# Patient Record
Sex: FEMALE | Race: WHITE | NOT HISPANIC OR LATINO
[De-identification: names, ages, dates, MRNs, and addresses within clinical notes are randomized per-mention and may not be internally consistent; named-entity substitution may affect disease eponyms.]

---

## 2017-04-04 NOTE — ASU PATIENT PROFILE, ADULT - HEALTHCARE QUESTIONS, PROFILE
spoke with Dr. Dimas & anesthesia prior to surgery spoke with Dr. avila & anesthesia prior to surgery

## 2017-04-04 NOTE — ASU PATIENT PROFILE, ADULT - PSH
Cataract of right eye    History of colon resection History of bilateral cataract extraction    History of colon resection    S/P shoulder surgery

## 2017-04-05 ENCOUNTER — TRANSCRIPTION ENCOUNTER (OUTPATIENT)
Age: 67
End: 2017-04-05

## 2017-04-05 ENCOUNTER — OUTPATIENT (OUTPATIENT)
Dept: OUTPATIENT SERVICES | Facility: HOSPITAL | Age: 67
LOS: 1 days | End: 2017-04-05
Payer: MEDICARE

## 2017-04-05 VITALS
SYSTOLIC BLOOD PRESSURE: 137 MMHG | HEART RATE: 75 BPM | RESPIRATION RATE: 16 BRPM | OXYGEN SATURATION: 96 % | HEIGHT: 64 IN | WEIGHT: 151.24 LBS | DIASTOLIC BLOOD PRESSURE: 76 MMHG | TEMPERATURE: 98 F

## 2017-04-05 VITALS
HEART RATE: 79 BPM | SYSTOLIC BLOOD PRESSURE: 102 MMHG | DIASTOLIC BLOOD PRESSURE: 57 MMHG | OXYGEN SATURATION: 98 % | RESPIRATION RATE: 16 BRPM

## 2017-04-05 DIAGNOSIS — Z98.41 CATARACT EXTRACTION STATUS, RIGHT EYE: Chronic | ICD-10-CM

## 2017-04-05 DIAGNOSIS — H35.371 PUCKERING OF MACULA, RIGHT EYE: ICD-10-CM

## 2017-04-05 DIAGNOSIS — Z98.890 OTHER SPECIFIED POSTPROCEDURAL STATES: Chronic | ICD-10-CM

## 2017-04-05 PROCEDURE — 67041 VIT FOR MACULAR PUCKER: CPT | Mod: RT,XS

## 2017-04-05 PROCEDURE — C1889: CPT

## 2017-04-05 PROCEDURE — 67042 VIT FOR MACULAR HOLE: CPT | Mod: RT

## 2017-04-05 NOTE — ASU DISCHARGE PLAN (ADULT/PEDIATRIC). - NOTIFY
Pain not relieved by Medications/Fever greater than 101/Bleeding that does not stop/Swelling that continues/Persistent Nausea and Vomiting

## 2017-04-05 NOTE — ASU DISCHARGE PLAN (ADULT/PEDIATRIC). - PT EDUC
other (specify)/[employer] card, green bracelet, Eye shield with instructions , sunglasses and eye kit given to patient.

## 2017-12-18 ENCOUNTER — RESULT REVIEW (OUTPATIENT)
Age: 67
End: 2017-12-18

## 2018-11-12 ENCOUNTER — APPOINTMENT (OUTPATIENT)
Dept: CARDIOLOGY | Facility: CLINIC | Age: 68
End: 2018-11-12
Payer: MEDICARE

## 2018-11-12 ENCOUNTER — LABORATORY RESULT (OUTPATIENT)
Age: 68
End: 2018-11-12

## 2018-11-12 ENCOUNTER — NON-APPOINTMENT (OUTPATIENT)
Age: 68
End: 2018-11-12

## 2018-11-12 VITALS
DIASTOLIC BLOOD PRESSURE: 82 MMHG | OXYGEN SATURATION: 97 % | HEART RATE: 70 BPM | RESPIRATION RATE: 15 BRPM | BODY MASS INDEX: 26.12 KG/M2 | SYSTOLIC BLOOD PRESSURE: 118 MMHG | HEIGHT: 64 IN | WEIGHT: 153 LBS

## 2018-11-12 DIAGNOSIS — Z86.010 PERSONAL HISTORY OF COLONIC POLYPS: ICD-10-CM

## 2018-11-12 DIAGNOSIS — Z78.9 OTHER SPECIFIED HEALTH STATUS: ICD-10-CM

## 2018-11-12 PROCEDURE — 99204 OFFICE O/P NEW MOD 45 MIN: CPT

## 2018-11-12 PROCEDURE — 93000 ELECTROCARDIOGRAM COMPLETE: CPT

## 2018-11-12 RX ORDER — ELECTROLYTES/DEXTROSE
SOLUTION, ORAL ORAL DAILY
Refills: 0 | Status: ACTIVE | COMMUNITY
Start: 2018-11-12

## 2018-11-12 NOTE — ASSESSMENT
[FreeTextEntry1] : This is a 68 year-old-female history of colon cancer, hyperlipidemia, comes in for followup cardiac evaluation. She denies chest pain, chest pressure, PND, dizziness, or syncope. The patient had recent blood work done by Dr. Schoenhaus-Luchs, which revealed an LDL cholesterol 193 mg/dL and a HDL of 55mg/dL. Her last visit was 3 years ago in 2015, and her bloodwork then showed a LDL of 140 mg/dL. She is not on any cholesterol medication and was advised in 2015 to control her diet and have life style modification in hopes of lowering LDL cholesterol.

## 2018-11-12 NOTE — REASON FOR VISIT
[Follow-Up - Clinic] : a clinic follow-up of [FreeTextEntry1] : This is a 68 year-old-female history of colon cancer, hyperlipidemia, comes in for followup cardiac evaluation.

## 2018-11-12 NOTE — PHYSICAL EXAM
[General Appearance - Well Developed] : well developed [Normal Appearance] : normal appearance [Well Groomed] : well groomed [General Appearance - Well Nourished] : well nourished [No Deformities] : no deformities [General Appearance - In No Acute Distress] : no acute distress [Normal Conjunctiva] : the conjunctiva exhibited no abnormalities [Normal Oral Mucosa] : normal oral mucosa [Normal Jugular Venous A Waves Present] : normal jugular venous A waves present [Normal Jugular Venous V Waves Present] : normal jugular venous V waves present [No Jugular Venous Browne A Waves] : no jugular venous browne A waves [Heart Rate And Rhythm] : heart rate and rhythm were normal [Heart Sounds] : normal S1 and S2 [Murmurs] : no murmurs present [Respiration, Rhythm And Depth] : normal respiratory rhythm and effort [Exaggerated Use Of Accessory Muscles For Inspiration] : no accessory muscle use [Auscultation Breath Sounds / Voice Sounds] : lungs were clear to auscultation bilaterally [Abdomen Soft] : soft [Abnormal Walk] : normal gait [Gait - Sufficient For Exercise Testing] : the gait was sufficient for exercise testing [Nail Clubbing] : no clubbing of the fingernails [Cyanosis, Localized] : no localized cyanosis [Skin Color & Pigmentation] : normal skin color and pigmentation [] : no rash [Oriented To Time, Place, And Person] : oriented to person, place, and time [Affect] : the affect was normal [Mood] : the mood was normal [No Anxiety] : not feeling anxious [5th Left ICS - MCL] : palpated at the 5th LICS in the midclavicular line [Normal] : normal [No Precordial Heave] : no precordial heave was noted [Normal Rate] : normal [Rhythm Regular] : regular [Normal S1] : normal S1 [Normal S2] : normal S2 [No Gallop] : no gallop heard [S3] : no S3 [S4] : no S4 [Click] : no click [Distant] : the heart sounds were ~L not distant [Pericardial Rub] : no pericardial rub [II] : a grade 2 [Right Carotid Bruit] : no bruit heard over the right carotid [Left Carotid Bruit] : no bruit heard over the left carotid [Right Femoral Bruit] : no bruit heard over the right femoral artery [Left Femoral Bruit] : no bruit heard over the left femoral artery [2+] : left 2+ [No Abnormalities] : the abdominal aorta was not enlarged and no bruit was heard [No Pitting Edema] : no pitting edema present [Bowel Sounds] : normal bowel sounds

## 2018-11-12 NOTE — DISCUSSION/SUMMARY
[FreeTextEntry1] : This is a 68-year-old female past medical history significant for hypercholesterolemia, history of colon cancer, murmurs, comes in for cardiac consultation. She denies chest pain, shortness of breath, dizziness or syncope.\par The patient's cardiac risk factors include history of hypercholesterolemia. She reports that both brothers also high cholesterol.\par She has no history of rheumatic fever. She does not drink excessive caffeine or alcohol. She does not feel that her diet is poor.\par Blood work done by her primary care physician July 13, 2018 demonstrated total cholesterol 273 mg/dL, LDL calculated cholesterol 193 mg/dL, HDL 55 mg/dL, triglyceride 126 mg/dL, hemoglobin A1c of 5.4.\par The finding of LDL cholesterol greater than 190 is consistent with familial heterozygous hypercholesterolemia. The patient reports that she has had LDL cholesterols in the past of 140 mg/dL. A measure of direct LDL cholesterol is more accurate as usually higher than the calculated number. Blood work was done today for direct LDL cholesterol.\par Electrocardiogram done November 12, 2018 demonstrated normal sinus rhythm rate 70 beats per minutes otherwise unremarkable.\par Patient is instructed to follow with you regarding her overall medical care. She will schedule echo Doppler examination to evaluate her murmur, chamber size, left ventricular function, rule out hypertrophy.\par Further recommendations will made after the results of the blood tests are available.

## 2018-12-05 ENCOUNTER — APPOINTMENT (OUTPATIENT)
Dept: CARDIOLOGY | Facility: CLINIC | Age: 68
End: 2018-12-05
Payer: MEDICARE

## 2018-12-05 VITALS
HEIGHT: 64 IN | HEART RATE: 92 BPM | DIASTOLIC BLOOD PRESSURE: 80 MMHG | BODY MASS INDEX: 24.75 KG/M2 | RESPIRATION RATE: 16 BRPM | WEIGHT: 145 LBS | SYSTOLIC BLOOD PRESSURE: 130 MMHG

## 2018-12-05 PROCEDURE — 93015 CV STRESS TEST SUPVJ I&R: CPT

## 2018-12-05 PROCEDURE — 99213 OFFICE O/P EST LOW 20 MIN: CPT | Mod: 25

## 2018-12-05 PROCEDURE — 93306 TTE W/DOPPLER COMPLETE: CPT

## 2018-12-05 NOTE — PHYSICAL EXAM
[General Appearance - Well Developed] : well developed [Normal Appearance] : normal appearance [Well Groomed] : well groomed [General Appearance - Well Nourished] : well nourished [No Deformities] : no deformities [General Appearance - In No Acute Distress] : no acute distress [Normal Conjunctiva] : the conjunctiva exhibited no abnormalities [Normal Oral Mucosa] : normal oral mucosa [Normal Jugular Venous A Waves Present] : normal jugular venous A waves present [Normal Jugular Venous V Waves Present] : normal jugular venous V waves present [No Jugular Venous Browne A Waves] : no jugular venous browne A waves [Respiration, Rhythm And Depth] : normal respiratory rhythm and effort [Exaggerated Use Of Accessory Muscles For Inspiration] : no accessory muscle use [Auscultation Breath Sounds / Voice Sounds] : lungs were clear to auscultation bilaterally [Bowel Sounds] : normal bowel sounds [Abdomen Soft] : soft [Abnormal Walk] : normal gait [Gait - Sufficient For Exercise Testing] : the gait was sufficient for exercise testing [Nail Clubbing] : no clubbing of the fingernails [Cyanosis, Localized] : no localized cyanosis [Skin Color & Pigmentation] : normal skin color and pigmentation [] : no rash [Oriented To Time, Place, And Person] : oriented to person, place, and time [Affect] : the affect was normal [Mood] : the mood was normal [No Anxiety] : not feeling anxious [5th Left ICS - MCL] : palpated at the 5th LICS in the midclavicular line [Normal] : normal [No Precordial Heave] : no precordial heave was noted [Normal Rate] : normal [Rhythm Regular] : regular [Normal S1] : normal S1 [Normal S2] : normal S2 [No Gallop] : no gallop heard [S3] : no S3 [S4] : no S4 [Click] : no click [Pericardial Rub] : no pericardial rub [II] : a grade 2 [Right Carotid Bruit] : no bruit heard over the right carotid [Left Carotid Bruit] : no bruit heard over the left carotid [Right Femoral Bruit] : no bruit heard over the right femoral artery [Left Femoral Bruit] : no bruit heard over the left femoral artery [2+] : left 2+ [No Abnormalities] : the abdominal aorta was not enlarged and no bruit was heard [No Pitting Edema] : no pitting edema present

## 2018-12-06 ENCOUNTER — RESULT CHARGE (OUTPATIENT)
Age: 68
End: 2018-12-06

## 2018-12-06 RX ORDER — ZINC OXIDE 13 %
CREAM (GRAM) TOPICAL
Refills: 0 | Status: DISCONTINUED | COMMUNITY
End: 2018-12-06

## 2018-12-06 RX ORDER — MULTIVITAMIN
TABLET ORAL
Refills: 0 | Status: DISCONTINUED | COMMUNITY
End: 2018-12-06

## 2018-12-06 RX ORDER — CALCIUM CARBONATE/VITAMIN D3 600 MG-10
TABLET ORAL
Refills: 0 | Status: DISCONTINUED | COMMUNITY
End: 2018-12-06

## 2018-12-06 NOTE — REASON FOR VISIT
[Dyspnea] : dyspnea [Follow-Up - Clinic] : a clinic follow-up of [FreeTextEntry1] : This is a 68 year-old-female history of colon cancer, hyperlipidemia, comes in for followup cardiac evaluation. [Hyperlipidemia] : hyperlipidemia

## 2018-12-07 NOTE — PHYSICAL EXAM
[S3] : no S3 [S4] : no S4 [Click] : no click [Pericardial Rub] : no pericardial rub [Right Carotid Bruit] : no bruit heard over the right carotid [Left Carotid Bruit] : no bruit heard over the left carotid [Right Femoral Bruit] : no bruit heard over the right femoral artery [Left Femoral Bruit] : no bruit heard over the left femoral artery

## 2018-12-07 NOTE — ASSESSMENT
[FreeTextEntry1] : This is a 68 year-old-female history of colon cancer, hyperlipidemia, comes in for followup cardiac evaluation.

## 2018-12-07 NOTE — DISCUSSION/SUMMARY
[FreeTextEntry1] : This is a 68-year-old female past medical history significant for hypercholesterolemia, history of colon cancer, murmurs, comes in for cardiac follow up evaluation. She denies chest pain, shortness of breath, dizziness or syncope.\par The patient had a normal exercise stress test today. She has significantly elevated total cholesterol, with an LDL cholesterol 194 mg/dL, total cholesterol 301 mg/dL. A cholesterol done by her primary care physician approximately one year ago also demonstrates an LDL cholesterol greater than 190 mg/dL. These findings are suggestive of heterozygous familial hypercholesterolemia. I recommend the patient start on statin therapy. She refuses to do so. She wants further convincing that she requires this type of intervention. A detailed discussion, with explanation of the pathophysiology and biology of progressive atherosclerotic heart disease to place today.\par She wishes to schedule a nuclear stress test to evaluate her symptoms and rule out significant coronary artery disease. She will also obtain a calcium score. She does have a family history of hypercholesterolemia involving siblings. She also reports that one of her 2 children and high cholesterol as well. She will followup with me at the above noted diagnostic tests are completed.\par Exercise stress test done today demonstrated no evidence for myocardial ischemia.\par \par \par The patient's cardiac risk factors include history of hypercholesterolemia. She reports that both brothers also high cholesterol.\par She has no history of rheumatic fever. She does not drink excessive caffeine or alcohol. She does not feel that her diet is poor.\par Blood work done by her primary care physician July 13, 2018 demonstrated total cholesterol 273 mg/dL, LDL calculated cholesterol 193 mg/dL, HDL 55 mg/dL, triglyceride 126 mg/dL, hemoglobin A1c of 5.4.\par The finding of LDL cholesterol greater than 190 is consistent with familial heterozygous hypercholesterolemia. The patient reports that she has had LDL cholesterols in the past of 140 mg/dL. A measure of direct LDL cholesterol is more accurate as usually higher than the calculated number. Blood work was done today for direct LDL cholesterol.\par Electrocardiogram done November 12, 2018 demonstrated normal sinus rhythm rate 70 beats per minutes otherwise unremarkable.\par Patient is instructed to follow with you regarding her overall medical care. She will schedule echo Doppler examination to evaluate her murmur, chamber size, left ventricular function, rule out hypertrophy.\par Further recommendations will made after the results of the blood tests are available.

## 2018-12-07 NOTE — REVIEW OF SYSTEMS
[Shortness Of Breath] : no shortness of breath [Chest  Pressure] : no chest pressure [Chest Pain] : no chest pain [Lower Ext Edema] : no extremity edema [Leg Claudication] : no intermittent leg claudication [Palpitations] : no palpitations

## 2019-01-08 NOTE — ASU PATIENT PROFILE, ADULT - BILL OF RIGHTS/ADMISSION INFORMATION PROVIDED TO:
Patient called stating that she is on birth control and she is bleeding in the second week of her pills. She stated this happened previously and she would like to know what to do. She would like for someone to call her.  Please advise Patient

## 2019-05-13 ENCOUNTER — FORM ENCOUNTER (OUTPATIENT)
Age: 69
End: 2019-05-13

## 2019-05-14 ENCOUNTER — OUTPATIENT (OUTPATIENT)
Dept: OUTPATIENT SERVICES | Facility: HOSPITAL | Age: 69
LOS: 1 days | End: 2019-05-14
Payer: SELF-PAY

## 2019-05-14 ENCOUNTER — APPOINTMENT (OUTPATIENT)
Dept: CT IMAGING | Facility: CLINIC | Age: 69
End: 2019-05-14
Payer: SELF-PAY

## 2019-05-14 DIAGNOSIS — Z00.8 ENCOUNTER FOR OTHER GENERAL EXAMINATION: ICD-10-CM

## 2019-05-14 DIAGNOSIS — Z98.890 OTHER SPECIFIED POSTPROCEDURAL STATES: Chronic | ICD-10-CM

## 2019-05-14 DIAGNOSIS — Z98.41 CATARACT EXTRACTION STATUS, RIGHT EYE: Chronic | ICD-10-CM

## 2019-05-14 PROCEDURE — 75571 CT HRT W/O DYE W/CA TEST: CPT | Mod: 26

## 2019-05-14 PROCEDURE — 75571 CT HRT W/O DYE W/CA TEST: CPT

## 2019-05-23 ENCOUNTER — APPOINTMENT (OUTPATIENT)
Dept: CARDIOLOGY | Facility: CLINIC | Age: 69
End: 2019-05-23
Payer: MEDICARE

## 2019-05-23 PROCEDURE — 78452 HT MUSCLE IMAGE SPECT MULT: CPT

## 2019-05-23 PROCEDURE — 93015 CV STRESS TEST SUPVJ I&R: CPT

## 2019-05-23 PROCEDURE — A9500: CPT

## 2019-07-17 ENCOUNTER — NON-APPOINTMENT (OUTPATIENT)
Age: 69
End: 2019-07-17

## 2019-07-17 ENCOUNTER — APPOINTMENT (OUTPATIENT)
Dept: CARDIOLOGY | Facility: CLINIC | Age: 69
End: 2019-07-17
Payer: MEDICARE

## 2019-07-17 VITALS
DIASTOLIC BLOOD PRESSURE: 80 MMHG | SYSTOLIC BLOOD PRESSURE: 125 MMHG | HEIGHT: 64 IN | WEIGHT: 146 LBS | BODY MASS INDEX: 24.92 KG/M2 | HEART RATE: 60 BPM | RESPIRATION RATE: 15 BRPM

## 2019-07-17 PROCEDURE — 93000 ELECTROCARDIOGRAM COMPLETE: CPT

## 2019-07-17 PROCEDURE — 99213 OFFICE O/P EST LOW 20 MIN: CPT

## 2019-09-17 ENCOUNTER — APPOINTMENT (OUTPATIENT)
Dept: PULMONOLOGY | Facility: CLINIC | Age: 69
End: 2019-09-17

## 2019-10-28 ENCOUNTER — APPOINTMENT (OUTPATIENT)
Dept: GERIATRICS | Facility: CLINIC | Age: 69
End: 2019-10-28
Payer: MEDICARE

## 2019-10-28 VITALS
WEIGHT: 149.6 LBS | SYSTOLIC BLOOD PRESSURE: 120 MMHG | HEART RATE: 88 BPM | RESPIRATION RATE: 15 BRPM | DIASTOLIC BLOOD PRESSURE: 76 MMHG | BODY MASS INDEX: 25.54 KG/M2 | HEIGHT: 64 IN | OXYGEN SATURATION: 98 % | TEMPERATURE: 97.9 F

## 2019-10-28 DIAGNOSIS — Z23 ENCOUNTER FOR IMMUNIZATION: ICD-10-CM

## 2019-10-28 PROCEDURE — 99204 OFFICE O/P NEW MOD 45 MIN: CPT | Mod: 25

## 2019-10-28 PROCEDURE — G0008: CPT

## 2019-10-28 PROCEDURE — 90662 IIV NO PRSV INCREASED AG IM: CPT

## 2019-10-28 NOTE — HISTORY OF PRESENT ILLNESS
[FreeTextEntry1] : 70yo F with pmhx of HLD, congenital 1 kidney,  \par hx of colon cancer: s/p partial resection 12-14 inches 1999, s/p chemotherapy.\par Dr. Conroy GI:  last colonscopy 7/11/2018: polyps removed.  \par Dr. Venita Gallo: oncologist: last seen 6 months ago.\par no wt loss, bleeding, ab pain, or changes in stool.\par \par Bone density: 2017: Osteopenia \par currently taking vitamin D\par \par family hx: brothers with HLD, mother: no stroke, dm2, or heart disease.\par \par lives with \par 2 sons: live in [facility]\par works as CPA, but retiring\par smoked 5 years, in 20's, \par social drinker\par \par seasonal allergies\par \par cr 1.10 in May

## 2019-10-28 NOTE — REVIEW OF SYSTEMS
[Constipation] : constipation [Sleep Disturbances] : sleep disturbances [Anxiety] : anxiety [Negative] : Heme/Lymph [Diarrhea] : no diarrhea [FreeTextEntry3] : right eye with macular wrinkling s/p surgery 2018, hx of torn retina in left eye, but vision in L>R [FreeTextEntry4] : impaired hearing, with chronic "whistling" right ear. no hearing aides, but has had audiology exam [FreeTextEntry8] : vaginal dryness,  [FreeTextEntry9] : left knee pain with walking up stairs, left 1st toe pain [de-identified] : awakes at 3am with racing mind

## 2019-10-28 NOTE — ASSESSMENT
[FreeTextEntry1] : sleep disturbance:  discussed r/b/a ambien, refill provided #: [id number] \par Anxiety:  discussed CBT, hold off on medication at this time and f;u at next visit\par osteopenia: dexa repeat, CA and Vit D\par hx colon cancer: in remission follows with Dr. Gallo\par HLD: not on statin.  chol 258 and \par \par HCM:\par has zostavax:  recommended for Shingrix\par follow up on bone density: 2017: \par mammogram f/u screening\par \par resistance training

## 2019-10-28 NOTE — PHYSICAL EXAM
[General Appearance - Alert] : alert [General Appearance - In No Acute Distress] : in no acute distress [General Appearance - Well Nourished] : well nourished [Sclera] : the sclera and conjunctiva were normal [Extraocular Movements] : extraocular movements were intact [Normal Oral Mucosa] : normal oral mucosa [No Oral Pallor] : no oral pallor [No Oral Cyanosis] : no oral cyanosis [Neck Appearance] : the appearance of the neck was normal [Neck Cervical Mass (___cm)] : no neck mass was observed [Respiration, Rhythm And Depth] : normal respiratory rhythm and effort [Exaggerated Use Of Accessory Muscles For Inspiration] : no accessory muscle use [Auscultation Breath Sounds / Voice Sounds] : lungs were clear to auscultation bilaterally [Heart Rate And Rhythm] : heart rate was normal and rhythm regular [Heart Sounds] : normal S1 and S2 [Edema] : there was no peripheral edema [Bowel Sounds] : normal bowel sounds [Abdomen Soft] : soft [Abdomen Tenderness] : non-tender [No Spinal Tenderness] : no spinal tenderness [Nail Clubbing] : no clubbing  or cyanosis of the fingernails [Involuntary Movements] : no involuntary movements were seen [Musculoskeletal - Swelling] : no joint swelling seen [Skin Color & Pigmentation] : normal skin color and pigmentation [] : no rash [No Focal Deficits] : no focal deficits [Impaired Insight] : insight and judgment were intact [Affect] : the affect was normal [Mood] : the mood was normal

## 2019-11-21 ENCOUNTER — APPOINTMENT (OUTPATIENT)
Dept: PULMONOLOGY | Facility: CLINIC | Age: 69
End: 2019-11-21
Payer: MEDICARE

## 2019-11-21 VITALS
HEIGHT: 64 IN | SYSTOLIC BLOOD PRESSURE: 133 MMHG | DIASTOLIC BLOOD PRESSURE: 88 MMHG | BODY MASS INDEX: 24.75 KG/M2 | OXYGEN SATURATION: 98 % | HEART RATE: 100 BPM | WEIGHT: 145 LBS

## 2019-11-21 DIAGNOSIS — Z83.3 FAMILY HISTORY OF DIABETES MELLITUS: ICD-10-CM

## 2019-11-21 DIAGNOSIS — Z83.438 FAMILY HISTORY OF OTHER DISORDER OF LIPOPROTEIN METABOLISM AND OTHER LIPIDEMIA: ICD-10-CM

## 2019-11-21 DIAGNOSIS — Z85.038 PERSONAL HISTORY OF OTHER MALIGNANT NEOPLASM OF LARGE INTESTINE: ICD-10-CM

## 2019-11-21 PROCEDURE — 99204 OFFICE O/P NEW MOD 45 MIN: CPT | Mod: 25

## 2019-11-21 PROCEDURE — 94618 PULMONARY STRESS TESTING: CPT

## 2019-11-21 PROCEDURE — 94727 GAS DIL/WSHOT DETER LNG VOL: CPT

## 2019-11-21 PROCEDURE — 94729 DIFFUSING CAPACITY: CPT

## 2019-11-21 PROCEDURE — 71046 X-RAY EXAM CHEST 2 VIEWS: CPT

## 2019-11-21 PROCEDURE — 94060 EVALUATION OF WHEEZING: CPT

## 2019-11-21 NOTE — REASON FOR VISIT
[Initial Evaluation] : an initial evaluation [Spouse] : spouse [FreeTextEntry1] : asthma, allergies, poor sleep.

## 2019-11-21 NOTE — PHYSICAL EXAM
[General Appearance - Well Developed] : well developed [Normal Appearance] : normal appearance [Well Groomed] : well groomed [General Appearance - Well Nourished] : well nourished [No Deformities] : no deformities [General Appearance - In No Acute Distress] : no acute distress [Normal Conjunctiva] : the conjunctiva exhibited no abnormalities [Eyelids - No Xanthelasma] : the eyelids demonstrated no xanthelasmas [Normal Oropharynx] : normal oropharynx [Neck Appearance] : the appearance of the neck was normal [Neck Cervical Mass (___cm)] : no neck mass was observed [Jugular Venous Distention Increased] : there was no jugular-venous distention [Thyroid Diffuse Enlargement] : the thyroid was not enlarged [Thyroid Nodule] : there were no palpable thyroid nodules [Heart Rate And Rhythm] : heart rate and rhythm were normal [Heart Sounds] : normal S1 and S2 [Murmurs] : no murmurs present [Respiration, Rhythm And Depth] : normal respiratory rhythm and effort [Exaggerated Use Of Accessory Muscles For Inspiration] : no accessory muscle use [Abdomen Soft] : soft [Abdomen Tenderness] : non-tender [Abdomen Mass (___ Cm)] : no abdominal mass palpated [Abnormal Walk] : normal gait [Gait - Sufficient For Exercise Testing] : the gait was sufficient for exercise testing [Nail Clubbing] : no clubbing of the fingernails [Cyanosis, Localized] : no localized cyanosis [Petechial Hemorrhages (___cm)] : no petechial hemorrhages [Skin Color & Pigmentation] : normal skin color and pigmentation [Skin Turgor] : normal skin turgor [] : no rash [Deep Tendon Reflexes (DTR)] : deep tendon reflexes were 2+ and symmetric [Sensation] : the sensory exam was normal to light touch and pinprick [No Focal Deficits] : no focal deficits [Oriented To Time, Place, And Person] : oriented to person, place, and time [Impaired Insight] : insight and judgment were intact [Affect] : the affect was normal [III] : III [Kyphosis] : kyphosis [FreeTextEntry1] : mild kyphosis

## 2019-11-21 NOTE — ADDENDUM
[FreeTextEntry1] : All medical record entries made by giana Pierce were at Dr. Dony Mar's direction and personally dictated by me on 11/21/2019. I have reviewed the chart and agree that the record accurately reflects my personal performance of the history, physical exam, assessment and plan. I have also personally directed, reviewed, and agree with the discharge instructions.

## 2019-11-21 NOTE — HISTORY OF PRESENT ILLNESS
[FreeTextEntry1] : Ms. Calvillo is a 69 year old female presenting to the office today for an initial visit. Her chief complaint is SOB / poor sleep. \par -she was referred to me by Dr. Linwood Lambert\par -she will be retiring from her job next week \par -she reports that she occasionally becomes SOB upon walking up stairs or exertion\par -she states that she has allergies which will frequently make her sinuses congested. \par -her mouth is frequently dry\par -her sleep is irregular, and she often has difficulty sleeping when she is stressed \par -her weight has been stable\par -she states that she would not be able to fall asleep while watching a boring TV show, or as a passenger in a car for over one hour \par -her  state that she snores\par -her memory and concentration have not been great\par -she denies any headaches, nausea, vomiting, fever, chills, sweats, chest pain, chest pressure, dysphagia, dizziness, leg swelling, leg pain, itchy eyes, itchy ears, heartburn, reflux, or sour taste in the mouth.

## 2019-11-21 NOTE — ASSESSMENT
[FreeTextEntry1] : Ms. LUJAN is a 69 year old female with a history of colon cancer (stage 3) s/p chemotherapy and resection, HLD, heart murmur, osteopenia, and poor sleep who now comes to the office for an initial pulmonary evaluation.\par \par Her shortness of breath is multifactorial due to:\par -poor mechanics of breathing \par -out of shape / overweight\par -pulmonary disease\par -cardiac disease \par \par problem 1: asthma\par -add Ventolin 2 puffs Q6H, pre-exercise \par -Asthma is believed to be caused by inherited (genetic) and environmental factor, but its exact cause is unknown. Asthma may be triggered by allergens, lung infections, or irritants in the air. Asthma triggers are different for each person \par -Inhaler technique reviewed as well as oral hygiene techniques reviewed with patient. Avoidance of cold air, extremes of temperature, rescue inhaler should be used before exercise. Order of medication reviewed with patient. Recommended use of a cool mist humidifier in the bedroom \par \par problem 2: allergy sinus \par -get Blood work to include: asthma panel, food IgE panel, IgE level, eosinophil level, vitamin D level \par -add Olopatadine 0.6% 1 sniff BID \par -Environmental measures for allergies were encouraged including mattress and pillow cover, air purifier, and environmental controls \par \par problem 3: poor sleep - r/o ?OSAS\par -get Home sleep study, if HSS c/w LOVELY, consider Oral Appliance \par Sleep apnea is associated with adverse clinical consequences which an affect most organ systems. Cardiovascular disease risk includes arrhythmias, atrial fibrillation, hypertension, coronary artery disease, and stroke. Metabolic disorders include diabetes type 2, non-alcoholic fatty liver disease. Mood disorder especially depression; and cognitive decline especially in the elderly. Associations with chronic reflux/Boswell’s esophagus some but not all inclusive. \par -Reasons include arousal consistent with hypopnea; respiratory events most prominent in REM sleep or supine position; therefore sleep staging and body position are important for accurate diagnosis and estimation of AHI.\par -Good sleep hygiene was encouraged including avoiding watching television an hour before bed, keeping caffeine at a low, avoiding reading, television, or anything, in bed, no drinking any liquids three hours before bedtime, and only getting into bed when tired and ready for sleep.\par \par problem 4: cardiac disease\par -recommended to continue to follow up with Cardiologist - Dr. Linwood Lambert\par \par problem 5: poor breathing mechanics\par -Proper breathing techniques were reviewed with an emphasis of exhalation. Patient instructed to breath in for 1 second and out for four seconds. Patient was encouraged to not talk while walking. \par \par problem 6: out of shape / overweight\par -Weight loss, exercise, and diet control were discussed and are highly encouraged. Treatment options were given such as, aqua therapy, and contacting a nutritionist. Recommended to use the elliptical, stationary bike, less use of treadmill. Mindful eating was explained to the patient Obesity is associated with worsening asthma, shortness of breath, and potential for cardiac disease, diabetes, and other underlying medical conditions\par \par problem 7: health maintenance \par -s/p yearly flu shot - 2019\par -recommended strep pneumonia vaccines: Prevnar-13 vaccine, followed by Pneumo vaccine 23 one year following\par -recommended early intervention for Upper Respiratory Infections (URIs)\par -recommended regular osteoporosis evaluations\par -recommended early dermatological evaluations\par -recommended after the age of 50 to the age of 70, colonoscopy every 5 years\par \par F/U in 6-8 weeks.\par She is encouraged to call with any changes, concerns, or questions

## 2019-11-21 NOTE — PROCEDURE
[FreeTextEntry1] : Cardiac CT (May 14, 2019) reveals mild linear scarring / atelectasis at the bases. Calculated Agatston score is 3.\par \par PFT - spi reveals mild obstructive dysfunction; FEV1 is 1.93 which is 80% of predicted, normal flow volume loop; 34% improvement with BD at mid-low lung volumes. Normal lung volumes / diffusion, DLCO is 16.8 which is 92% of predicted. \par \par CXR reveals a normal sized heart; no evidence of infiltrate or effusion--mamillated right hemidiaphragm \par \par ECHO December 2018) reveals no evidence of PAH. Mild MR, Mild TR. EF of 63%.\par \par 6 minute walk test reveals a low saturation of 98% with no evidence of dyspnea or fatigue; walked 603.1 meters

## 2020-01-13 ENCOUNTER — LABORATORY RESULT (OUTPATIENT)
Age: 70
End: 2020-01-13

## 2020-01-13 LAB
25(OH)D3 SERPL-MCNC: 40.9 NG/ML
BASOPHILS # BLD AUTO: 0.05 K/UL
BASOPHILS NFR BLD AUTO: 1.1 %
EOSINOPHIL # BLD AUTO: 0.43 K/UL
EOSINOPHIL NFR BLD AUTO: 9.3 %
HCT VFR BLD CALC: 46.2 %
HGB BLD-MCNC: 14.9 G/DL
IMM GRANULOCYTES NFR BLD AUTO: 0.2 %
LYMPHOCYTES # BLD AUTO: 1.84 K/UL
LYMPHOCYTES NFR BLD AUTO: 40 %
MAN DIFF?: NORMAL
MCHC RBC-ENTMCNC: 31 PG
MCHC RBC-ENTMCNC: 32.3 GM/DL
MCV RBC AUTO: 96.3 FL
MONOCYTES # BLD AUTO: 0.4 K/UL
MONOCYTES NFR BLD AUTO: 8.7 %
NEUTROPHILS # BLD AUTO: 1.87 K/UL
NEUTROPHILS NFR BLD AUTO: 40.7 %
PLATELET # BLD AUTO: 243 K/UL
RBC # BLD: 4.8 M/UL
RBC # FLD: 13 %
WBC # FLD AUTO: 4.6 K/UL

## 2020-01-14 LAB — 24R-OH-CALCIDIOL SERPL-MCNC: 37.7 PG/ML

## 2020-01-15 LAB
A ALTERNATA IGE QN: <0.1 KUA/L
A FUMIGATUS IGE QN: <0.1 KUA/L
C ALBICANS IGE QN: <0.1 KUA/L
C HERBARUM IGE QN: <0.1 KUA/L
CAT DANDER IGE QN: <0.1 KUA/L
CLAM IGE QN: <0.1 KUA/L
CODFISH IGE QN: <0.1 KUA/L
COMMON RAGWEED IGE QN: 0.47 KUA/L
CORN IGE QN: <0.1 KUA/L
COW MILK IGE QN: <0.1 KUA/L
D FARINAE IGE QN: 0.42 KUA/L
D PTERONYSS IGE QN: 0.23 KUA/L
DEPRECATED A ALTERNATA IGE RAST QL: 0
DEPRECATED A FUMIGATUS IGE RAST QL: 0
DEPRECATED C ALBICANS IGE RAST QL: 0
DEPRECATED C HERBARUM IGE RAST QL: 0
DEPRECATED CAT DANDER IGE RAST QL: 0
DEPRECATED CLAM IGE RAST QL: 0
DEPRECATED CODFISH IGE RAST QL: 0
DEPRECATED COMMON RAGWEED IGE RAST QL: 1
DEPRECATED CORN IGE RAST QL: 0
DEPRECATED COW MILK IGE RAST QL: 0
DEPRECATED D FARINAE IGE RAST QL: 1
DEPRECATED D PTERONYSS IGE RAST QL: NORMAL
DEPRECATED DOG DANDER IGE RAST QL: 0
DEPRECATED EGG WHITE IGE RAST QL: 0
DEPRECATED M RACEMOSUS IGE RAST QL: 0
DEPRECATED PEANUT IGE RAST QL: 0
DEPRECATED ROACH IGE RAST QL: 0
DEPRECATED SCALLOP IGE RAST QL: <0.1 KUA/L
DEPRECATED SESAME SEED IGE RAST QL: 0
DEPRECATED SHRIMP IGE RAST QL: 0
DEPRECATED SOYBEAN IGE RAST QL: 0
DEPRECATED TIMOTHY IGE RAST QL: 0
DEPRECATED WALNUT IGE RAST QL: 0
DEPRECATED WHEAT IGE RAST QL: 0
DEPRECATED WHITE OAK IGE RAST QL: 0
DOG DANDER IGE QN: <0.1 KUA/L
EGG WHITE IGE QN: <0.1 KUA/L
M RACEMOSUS IGE QN: <0.1 KUA/L
PEANUT IGE QN: <0.1 KUA/L
ROACH IGE QN: <0.1 KUA/L
SCALLOP IGE QN: 0
SCALLOP IGE QN: <0.1 KUA/L
SESAME SEED IGE QN: <0.1 KUA/L
SOYBEAN IGE QN: <0.1 KUA/L
TIMOTHY IGE QN: <0.1 KUA/L
TOTAL IGE SMQN RAST: 204 KU/L
WALNUT IGE QN: <0.1 KUA/L
WHEAT IGE QN: <0.1 KUA/L
WHITE OAK IGE QN: <0.1 KUA/L

## 2020-01-28 ENCOUNTER — LABORATORY RESULT (OUTPATIENT)
Age: 70
End: 2020-01-28

## 2020-01-29 ENCOUNTER — APPOINTMENT (OUTPATIENT)
Dept: CARDIOLOGY | Facility: CLINIC | Age: 70
End: 2020-01-29
Payer: MEDICARE

## 2020-01-29 VITALS
DIASTOLIC BLOOD PRESSURE: 78 MMHG | SYSTOLIC BLOOD PRESSURE: 124 MMHG | HEIGHT: 64 IN | WEIGHT: 145 LBS | BODY MASS INDEX: 24.75 KG/M2

## 2020-01-29 DIAGNOSIS — Z87.891 PERSONAL HISTORY OF NICOTINE DEPENDENCE: ICD-10-CM

## 2020-01-29 PROCEDURE — 99214 OFFICE O/P EST MOD 30 MIN: CPT

## 2020-02-04 ENCOUNTER — APPOINTMENT (OUTPATIENT)
Dept: CARDIOLOGY | Facility: CLINIC | Age: 70
End: 2020-02-04
Payer: MEDICARE

## 2020-02-04 PROCEDURE — 97802 MEDICAL NUTRITION INDIV IN: CPT

## 2020-03-03 ENCOUNTER — NON-APPOINTMENT (OUTPATIENT)
Age: 70
End: 2020-03-03

## 2020-03-03 ENCOUNTER — APPOINTMENT (OUTPATIENT)
Dept: PULMONOLOGY | Facility: CLINIC | Age: 70
End: 2020-03-03
Payer: MEDICARE

## 2020-03-03 ENCOUNTER — TRANSCRIPTION ENCOUNTER (OUTPATIENT)
Age: 70
End: 2020-03-03

## 2020-03-03 VITALS
OXYGEN SATURATION: 97 % | SYSTOLIC BLOOD PRESSURE: 110 MMHG | HEART RATE: 85 BPM | BODY MASS INDEX: 27.23 KG/M2 | RESPIRATION RATE: 16 BRPM | DIASTOLIC BLOOD PRESSURE: 70 MMHG | WEIGHT: 148 LBS | HEIGHT: 62 IN

## 2020-03-03 PROCEDURE — 94010 BREATHING CAPACITY TEST: CPT

## 2020-03-03 PROCEDURE — 99214 OFFICE O/P EST MOD 30 MIN: CPT | Mod: 25

## 2020-03-03 PROCEDURE — 95012 NITRIC OXIDE EXP GAS DETER: CPT

## 2020-03-03 NOTE — PROCEDURE
[FreeTextEntry1] : PFT revealed normal flows, with a FEV1 of 2.12L, which is 103% of predicted, with a normal flow volume loop\par \par FENO was 26; a normal value being less than 25\par Fractional exhaled nitric oxide (FENO) is regarded as a simple, noninvasive method for assessing eosinophilic airway inflammation. Produced by a variety of cells within the lung, nitric oxide (NO) concentrations are generally low in healthy individuals. However, high concentrations of NO appear to be involved in nonspecific host defense mechanisms and chronic inflammatory diseases such as asthma. The American Thor[employer] Society (ATS) therefore has recommended using FENO to aid in the diagnosis and monitoring of eosinophilic airway inflammation and asthma, and for identifying steroid responsive individuals whose chronic respiratory symptoms may be caused by airway inflammation.

## 2020-03-03 NOTE — ADDENDUM
[FreeTextEntry1] : Documented by Bernard Jacinto acting as a scribe for Dr. Dony Mar on 03/03/2020.\par \par All medical record entries made by the Scribe were at my, Dr. Dony Mar's, direction and personally dictated by me on 03/03/2020. I have reviewed the chart and agree that the record accurately reflects my personal performance of the history, physical exam, assessment and plan. I have also personally directed, reviewed, and agree with the discharge instructions.

## 2020-03-03 NOTE — ASSESSMENT
[FreeTextEntry1] : Ms. LUJAN is a 70 year old female with a history of colon cancer (stage 3) s/p chemotherapy and resection, HLD, heart murmur, osteopenia, and poor sleep who now comes to the office for a follow up pulmonary evaluation with eosinophilic / IgE mediated asthma.\par \par Her shortness of breath is multifactorial due to:\par -poor mechanics of breathing \par -out of shape / overweight\par -pulmonary disease\par -cardiac disease \par \par problem 1: asthma\par -add Ventolin 2 puffs Q6H, pre-exercise \par -Asthma is believed to be caused by inherited (genetic) and environmental factor, but its exact cause is unknown. Asthma may be triggered by allergens, lung infections, or irritants in the air. Asthma triggers are different for each person \par -Inhaler technique reviewed as well as oral hygiene techniques reviewed with patient. Avoidance of cold air, extremes of temperature, rescue inhaler should be used before exercise. Order of medication reviewed with patient. Recommended use of a cool mist humidifier in the bedroom \par \par Problem 1A: Eosinophilic asthma\par -Patient is a candidate for Fasenra, or dupixent\par Dupixent is a prescription medicine used with other asthma medicines for the maintenance treatment of moderate-to-severe asthma in people aged 12 years and older whose asthma is not controlled with their current asthma medicines. Dupixent helps prevent severe asthma attacks (exacerbations) and can improve your breathing. Dupixent may also help reduce the amount of oral corticosteroids you need while preventing severe asthma attacks and improving your breathing. Dupixent is not used to treat sudden breathing problems. Risks and side effect of Dupixent were discussed and reviewed with patient.\par -The safety and efficacy of Nucala was established in three double-blind, randomized, placebo-controlled trials in patients with severe asthma. Compared to a placebo, patients with severe asthma receiving Nucala had fewer exacerbation requiring hospitalization and/or emergency department visits, and a longer time to first exacerbation. In addition, patients with severe asthma receiving Nucala or Fasenra experienced greater reductions in their daily maintenance oral corticosteroid dose, while maintaining asthma control compared with patients receiving placebo. Treatment with Nucala did not result in a significant improvement in lung function, as measured by the volume of air exhaled by patients in one second. The most common side effects include: headache, injection site reactions, back pain, weakness, and fatigue; hypersensitivity reactions can occur within hours or days including swelling of the face, mouth, and tongue, fainting, dizziness, hives, breathing problems, and rash; herpes zoster infections have occurred. The drug is a monoclonal antibody that inhibits interleukin-5 which helps regular eosinophils, a type of white blood cell that contributes to asthma. The over-production of eosinophils can cause inflammation in the lungs, increasing the frequency of asthma attacks. Patients must also take other medications, including high dose inhaled corticosteroids and at least one additional asthma drug.\par \par Problem 1B: Elevated IgE\par -Patient is a candidate for Xolair\par -Xolair is a recombinant DNA- derived humanized IgG1K monoclonal antibody that selectively binds to human immunoglobulin E (IgE). Xolair is produced by a Chinese hamster ovary cell suspension culture in nutrient medium containing the antibiotic gentamicin. Gentamicin is not detectable in the final product. Xolair is a sterile, white, preservative free, lyophilized powder contained in a single use vial that is reconstituted with sterile water for suspension. Side effects include: wheezing, tightness of the chest, trouble breathing, hives, skin rash, feeling anxious or light-headed, fainting, warmth or tingling under skin, or swelling of face, lips, or tongue \par \par problem 2: allergy sinus \par -s/p Blood work to include: asthma panel (+), food IgE panel (+), IgE level (+), eosinophil level (+), vitamin D level (WNL)\par -continue Olopatadine 0.6% 1 sniff BID \par -Environmental measures for allergies were encouraged including mattress and pillow cover, air purifier, and environmental controls \par \par problem 3: poor sleep - r/o ?OSAS\par -get Home sleep study, if HSS c/w LOVELY, consider Oral Appliance \par Sleep apnea is associated with adverse clinical consequences which an affect most organ systems. Cardiovascular disease risk includes arrhythmias, atrial fibrillation, hypertension, coronary artery disease, and stroke. Metabolic disorders include diabetes type 2, non-alcoholic fatty liver disease. Mood disorder especially depression; and cognitive decline especially in the elderly. Associations with chronic reflux/Boswell’s esophagus some but not all inclusive. \par -Reasons include arousal consistent with hypopnea; respiratory events most prominent in REM sleep or supine position; therefore sleep staging and body position are important for accurate diagnosis and estimation of AHI.\par -Good sleep hygiene was encouraged including avoiding watching television an hour before bed, keeping caffeine at a low, avoiding reading, television, or anything, in bed, no drinking any liquids three hours before bedtime, and only getting into bed when tired and ready for sleep.\par \par problem 4: cardiac disease\par -recommended to continue to follow up with Cardiologist - Dr. Linwood Lambert\par \par problem 5: poor breathing mechanics\par -Proper breathing techniques were reviewed with an emphasis of exhalation. Patient instructed to breath in for 1 second and out for four seconds. Patient was encouraged to not talk while walking. \par \par problem 6: out of shape / overweight\par -Weight loss, exercise, and diet control were discussed and are highly encouraged. Treatment options were given such as, aqua therapy, and contacting a nutritionist. Recommended to use the elliptical, stationary bike, less use of treadmill. Mindful eating was explained to the patient Obesity is associated with worsening asthma, shortness of breath, and potential for cardiac disease, diabetes, and other underlying medical conditions\par \par problem 7: health maintenance \par -s/p yearly flu shot - 2019\par -recommended strep pneumonia vaccines: Prevnar-13 vaccine, followed by Pneumo vaccine 23 one year following (completed)\par -recommended early intervention for Upper Respiratory Infections (URIs)\par -recommended regular osteoporosis evaluations\par -recommended early dermatological evaluations\par -recommended after the age of 50 to the age of 70, colonoscopy every 5 years\par \par F/U in 6-8 weeks.\par She is encouraged to call with any changes, concerns, or questions

## 2020-03-03 NOTE — REVIEW OF SYSTEMS
[Negative] : Psychiatric [Leg Cramps] : leg cramps [Nasal Congestion] : no nasal congestion [Postnasal Drip] : no postnasal drip [Sinus Problems] : no sinus problems [Chest Discomfort] : no chest discomfort [GERD] : no gerd [Diarrhea] : no diarrhea [Constipation] : no constipation [Dysphagia] : no dysphagia

## 2020-03-03 NOTE — PHYSICAL EXAM
[General Appearance - Well Developed] : well developed [Normal Appearance] : normal appearance [No Deformities] : no deformities [Well Groomed] : well groomed [General Appearance - Well Nourished] : well nourished [Normal Conjunctiva] : the conjunctiva exhibited no abnormalities [Eyelids - No Xanthelasma] : the eyelids demonstrated no xanthelasmas [General Appearance - In No Acute Distress] : no acute distress [Normal Oropharynx] : normal oropharynx [Neck Appearance] : the appearance of the neck was normal [III] : III [Neck Cervical Mass (___cm)] : no neck mass was observed [Thyroid Diffuse Enlargement] : the thyroid was not enlarged [Jugular Venous Distention Increased] : there was no jugular-venous distention [Thyroid Nodule] : there were no palpable thyroid nodules [Heart Rate And Rhythm] : heart rate and rhythm were normal [Murmurs] : no murmurs present [Heart Sounds] : normal S1 and S2 [Respiration, Rhythm And Depth] : normal respiratory rhythm and effort [Auscultation Breath Sounds / Voice Sounds] : lungs were clear to auscultation bilaterally [Exaggerated Use Of Accessory Muscles For Inspiration] : no accessory muscle use [Abdomen Soft] : soft [Abdomen Tenderness] : non-tender [Abdomen Mass (___ Cm)] : no abdominal mass palpated [Abnormal Walk] : normal gait [Gait - Sufficient For Exercise Testing] : the gait was sufficient for exercise testing [Nail Clubbing] : no clubbing of the fingernails [Cyanosis, Localized] : no localized cyanosis [Petechial Hemorrhages (___cm)] : no petechial hemorrhages [Skin Color & Pigmentation] : normal skin color and pigmentation [Skin Turgor] : normal skin turgor [Deep Tendon Reflexes (DTR)] : deep tendon reflexes were 2+ and symmetric [] : no rash [No Focal Deficits] : no focal deficits [Sensation] : the sensory exam was normal to light touch and pinprick [Oriented To Time, Place, And Person] : oriented to person, place, and time [Impaired Insight] : insight and judgment were intact [Affect] : the affect was normal [FreeTextEntry1] : I:E ratio 1:3;  clear

## 2020-03-03 NOTE — HISTORY OF PRESENT ILLNESS
[FreeTextEntry1] : Ms. Calvillo is a 69 year old female with a history of allergies, asthma, snoring, and SOB presenting to the office today for a follow up visit. Her chief complaint is  poor sleep.\par -she reports feeling generally well\par -she states her sleep quality is variable, and is unsure why. She doesn’t believe it is related to eating certain foods or eating timing\par -she notes she doesn’t move around at night usually, but does occasionally\par -she notes she gets occasional leg cramps\par -she repots she snores. She sleeps better on her side, and doesn’t have shoulder pain\par -she notes her energy level is good at 8/10\par -she exercises by doing yoga and using the treadmill\par -she denies taking any new medications, vitamins, or supplements, except for a statin - taking it at night\par -she notes her breathing is good\par -she is s/p the PNA shots\par -she reports she is using the nasal spray, and it is working very well for her\par -she denies any chest pain, chest pressure, diarrhea, constipation, dysphagia, dizziness, sour taste in the mouth, heartburn, reflux

## 2020-03-16 ENCOUNTER — LABORATORY RESULT (OUTPATIENT)
Age: 70
End: 2020-03-16

## 2020-05-20 ENCOUNTER — APPOINTMENT (OUTPATIENT)
Dept: CARDIOLOGY | Facility: CLINIC | Age: 70
End: 2020-05-20
Payer: MEDICARE

## 2020-05-20 VITALS — HEIGHT: 62 IN

## 2020-05-20 PROCEDURE — 99213 OFFICE O/P EST LOW 20 MIN: CPT | Mod: 95

## 2020-05-20 RX ORDER — ROSUVASTATIN CALCIUM 20 MG/1
20 TABLET, FILM COATED ORAL
Qty: 90 | Refills: 1 | Status: COMPLETED | COMMUNITY
Start: 2020-01-31 | End: 2020-05-20

## 2020-06-29 ENCOUNTER — LABORATORY RESULT (OUTPATIENT)
Age: 70
End: 2020-06-29

## 2020-07-13 ENCOUNTER — NON-APPOINTMENT (OUTPATIENT)
Age: 70
End: 2020-07-13

## 2020-07-13 ENCOUNTER — APPOINTMENT (OUTPATIENT)
Dept: CARDIOLOGY | Facility: CLINIC | Age: 70
End: 2020-07-13
Payer: MEDICARE

## 2020-07-13 ENCOUNTER — APPOINTMENT (OUTPATIENT)
Dept: PULMONOLOGY | Facility: CLINIC | Age: 70
End: 2020-07-13
Payer: MEDICARE

## 2020-07-13 VITALS
BODY MASS INDEX: 25.95 KG/M2 | HEIGHT: 62 IN | HEART RATE: 77 BPM | RESPIRATION RATE: 16 BRPM | DIASTOLIC BLOOD PRESSURE: 80 MMHG | WEIGHT: 141 LBS | SYSTOLIC BLOOD PRESSURE: 118 MMHG

## 2020-07-13 PROCEDURE — 99213 OFFICE O/P EST LOW 20 MIN: CPT

## 2020-07-13 PROCEDURE — 99214 OFFICE O/P EST MOD 30 MIN: CPT | Mod: 95

## 2020-07-13 PROCEDURE — 93000 ELECTROCARDIOGRAM COMPLETE: CPT

## 2020-07-13 RX ORDER — GLUCOSA SU 2KCL/CHONDROITIN SU 500-400 MG
100 CAPSULE ORAL
Refills: 0 | Status: ACTIVE | COMMUNITY
Start: 2020-07-13

## 2020-07-13 RX ORDER — ROSUVASTATIN CALCIUM 20 MG/1
20 TABLET, FILM COATED ORAL
Qty: 90 | Refills: 1 | Status: DISCONTINUED | COMMUNITY
Start: 2020-01-31 | End: 2020-07-13

## 2020-07-13 NOTE — ADDENDUM
[FreeTextEntry1] : Documented by Nataliya Lawler acting as a scribe for Dr. Dony Mar on (07/13/2020).\par \par All medical record entries made by the Scribe were at my, Dr. Dony Mar's, direction and personally dictated by me on (07/13/2020). I have reviewed the chart and agree that the record accurately reflects my personal performance of the history, physical exam, assessment and plan. I have also personally directed, reviewed, and agree with the discharge instructions. \par \par \par

## 2020-07-13 NOTE — ASSESSMENT
[FreeTextEntry1] : Ms. LUJAN is a 70 year old female with a history of colon cancer (stage 3) s/p chemotherapy and resection, HLD, heart murmur, osteopenia, and poor sleep who now comes to the office for a follow-up pulmonary evaluation via video call (stable).\par \par Her shortness of breath is multifactorial due to:\par -poor mechanics of breathing \par -out of shape / overweight\par -pulmonary disease\par -cardiac disease \par \par problem 1: asthma- stable\par -add Ventolin 2 puffs Q6H, pre-exercise \par -Asthma is believed to be caused by inherited (genetic) and environmental factor, but its exact cause is unknown. Asthma may be triggered by allergens, lung infections, or irritants in the air. Asthma triggers are different for each person \par -Inhaler technique reviewed as well as oral hygiene techniques reviewed with patient. Avoidance of cold air, extremes of temperature, rescue inhaler should be used before exercise. Order of medication reviewed with patient. Recommended use of a cool mist humidifier in the bedroom \par \par problem 2: allergy sinus \par -get Blood work to include: asthma panel, food IgE panel, IgE level, eosinophil level, vitamin D level \par -add Olopatadine 0.6% 1 sniff BID \par -Environmental measures for allergies were encouraged including mattress and pillow cover, air purifier, and environmental controls \par \par problem 3: poor sleep - r/o ?OSAS\par -get Home sleep study, if HSS c/w LOVELY, consider Oral Appliance \par Sleep apnea is associated with adverse clinical consequences which an affect most organ systems. Cardiovascular disease risk includes arrhythmias, atrial fibrillation, hypertension, coronary artery disease, and stroke. Metabolic disorders include diabetes type 2, non-alcoholic fatty liver disease. Mood disorder especially depression; and cognitive decline especially in the elderly. Associations with chronic reflux/Boswell’s esophagus some but not all inclusive. \par -Reasons include arousal consistent with hypopnea; respiratory events most prominent in REM sleep or supine position; therefore sleep staging and body position are important for accurate diagnosis and estimation of AHI.\par -Good sleep hygiene was encouraged including avoiding watching television an hour before bed, keeping caffeine at a low, avoiding reading, television, or anything, in bed, no drinking any liquids three hours before bedtime, and only getting into bed when tired and ready for sleep.\par \par problem 4: cardiac disease\par -recommended to continue to follow up with Cardiologist - Dr. Linwood Lambert\par \par problem 5: poor breathing mechanics\par -Proper breathing techniques were reviewed with an emphasis of exhalation. Patient instructed to breath in for 1 second and out for four seconds. Patient was encouraged to not talk while walking. \par \par problem 6: out of shape / overweight\par -Weight loss, exercise, and diet control were discussed and are highly encouraged. Treatment options were given such as, aqua therapy, and contacting a nutritionist. Recommended to use the elliptical, stationary bike, less use of treadmill. Mindful eating was explained to the patient Obesity is associated with worsening asthma, shortness of breath, and potential for cardiac disease, diabetes, and other underlying medical conditions\par \par Problem 7a: COVID-19 precautionary Immune Support Recommendations:\par -OTC Vitamin C 500mg BID \par -OTC Quercetin 250-500mg BID \par -OTC Zinc 75-100mg per day \par -OTC Melatonin 1 or 2mg a night \par -OTC Vitamin D 1-4000mg per day \par -OTC Tonic Water 8oz per day\par -Water 0.5-1 gallon per day\par \par problem 7: health maintenance \par -s/p yearly flu shot - 2019\par -recommended strep pneumonia vaccines: Prevnar-13 vaccine, followed by Pneumo vaccine 23 one year following\par -recommended early intervention for Upper Respiratory Infections (URIs)\par -recommended regular osteoporosis evaluations\par -recommended early dermatological evaluations\par -recommended after the age of 50 to the age of 70, colonoscopy every 5 years\par \par F/U in 6-8 weeks.\par She is encouraged to call with any changes, concerns, or questions

## 2020-07-13 NOTE — HISTORY OF PRESENT ILLNESS
[Medical Office: ([facility])___] : at the medical office located in  [Home] : at home, [unfilled] , at the time of the visit. [Verbal consent obtained from patient] : the patient, [unfilled] [FreeTextEntry1] : Ms. Calvillo is a 70 year old female presenting to the office today for an follow-up visit via video call.\par - In general, she has been feeling well \par - No wheezing \par - Her weight is stable \par - She exercises every day- yoga, aerobics, weights, walking\par - Her bowels are regular \par - She has some lower back and knee pain \par - Her left knee hurts when she walks up the stairs\par - Her sleep is sometimes good and other times not \par - Her breathing is good\par - Her allergies are not good, but they are much improved with nasal spray \par - denies any headaches, nausea, vomiting, fever, chills, sweats, chest pain, chest pressure, diarrhea, constipation, dysphagia, dizziness, leg swelling, leg pain, itchy eyes, itchy ears, heartburn, reflux, or sour taste in the mouth.\par \par \par

## 2020-07-13 NOTE — REASON FOR VISIT
[Follow-Up] : a follow-up visit [Spouse] : spouse [FreeTextEntry1] : video call-asthma, allergies, poor sleep.

## 2020-07-14 DIAGNOSIS — Z01.812 ENCOUNTER FOR PREPROCEDURAL LABORATORY EXAMINATION: ICD-10-CM

## 2020-09-21 RX ORDER — ROSUVASTATIN CALCIUM 10 MG/1
10 TABLET, FILM COATED ORAL DAILY
Qty: 90 | Refills: 1 | Status: DISCONTINUED | COMMUNITY
Start: 2020-05-20 | End: 2020-09-21

## 2020-09-29 ENCOUNTER — APPOINTMENT (OUTPATIENT)
Dept: MRI IMAGING | Facility: CLINIC | Age: 70
End: 2020-09-29
Payer: MEDICARE

## 2020-09-29 ENCOUNTER — OUTPATIENT (OUTPATIENT)
Dept: OUTPATIENT SERVICES | Facility: HOSPITAL | Age: 70
LOS: 1 days | End: 2020-09-29
Payer: MEDICARE

## 2020-09-29 DIAGNOSIS — Z98.890 OTHER SPECIFIED POSTPROCEDURAL STATES: Chronic | ICD-10-CM

## 2020-09-29 DIAGNOSIS — Z98.41 CATARACT EXTRACTION STATUS, RIGHT EYE: Chronic | ICD-10-CM

## 2020-09-29 DIAGNOSIS — Z00.8 ENCOUNTER FOR OTHER GENERAL EXAMINATION: ICD-10-CM

## 2020-09-29 PROCEDURE — 70553 MRI BRAIN STEM W/O & W/DYE: CPT

## 2020-09-29 PROCEDURE — 70546 MR ANGIOGRAPH HEAD W/O&W/DYE: CPT

## 2020-09-29 PROCEDURE — A9579: CPT

## 2020-09-29 PROCEDURE — 70553 MRI BRAIN STEM W/O & W/DYE: CPT | Mod: 26

## 2020-09-29 PROCEDURE — A9585: CPT

## 2020-09-29 PROCEDURE — 70546 MR ANGIOGRAPH HEAD W/O&W/DYE: CPT | Mod: 26,59

## 2020-10-07 ENCOUNTER — LABORATORY RESULT (OUTPATIENT)
Age: 70
End: 2020-10-07

## 2020-10-19 ENCOUNTER — APPOINTMENT (OUTPATIENT)
Dept: CARDIOLOGY | Facility: CLINIC | Age: 70
End: 2020-10-19
Payer: MEDICARE

## 2020-10-19 VITALS
HEIGHT: 62 IN | HEART RATE: 72 BPM | SYSTOLIC BLOOD PRESSURE: 128 MMHG | DIASTOLIC BLOOD PRESSURE: 82 MMHG | WEIGHT: 145 LBS | BODY MASS INDEX: 26.68 KG/M2 | RESPIRATION RATE: 15 BRPM

## 2020-10-19 PROCEDURE — 99213 OFFICE O/P EST LOW 20 MIN: CPT

## 2020-10-19 PROCEDURE — 93306 TTE W/DOPPLER COMPLETE: CPT

## 2020-10-19 PROCEDURE — 93880 EXTRACRANIAL BILAT STUDY: CPT

## 2020-10-19 PROCEDURE — ZZZZZ: CPT

## 2020-10-29 LAB — SARS-COV-2 N GENE NPH QL NAA+PROBE: NOT DETECTED

## 2020-11-02 ENCOUNTER — APPOINTMENT (OUTPATIENT)
Dept: PULMONOLOGY | Facility: CLINIC | Age: 70
End: 2020-11-02
Payer: MEDICARE

## 2020-11-02 VITALS
HEIGHT: 63.8 IN | HEART RATE: 87 BPM | OXYGEN SATURATION: 97 % | SYSTOLIC BLOOD PRESSURE: 110 MMHG | RESPIRATION RATE: 16 BRPM | TEMPERATURE: 98 F | DIASTOLIC BLOOD PRESSURE: 70 MMHG | WEIGHT: 142 LBS | BODY MASS INDEX: 24.54 KG/M2

## 2020-11-02 PROCEDURE — 94727 GAS DIL/WSHOT DETER LNG VOL: CPT

## 2020-11-02 PROCEDURE — 95012 NITRIC OXIDE EXP GAS DETER: CPT

## 2020-11-02 PROCEDURE — 94729 DIFFUSING CAPACITY: CPT

## 2020-11-02 PROCEDURE — ZZZZZ: CPT

## 2020-11-02 PROCEDURE — 99214 OFFICE O/P EST MOD 30 MIN: CPT | Mod: 25

## 2020-11-02 PROCEDURE — 94010 BREATHING CAPACITY TEST: CPT

## 2020-11-02 NOTE — HISTORY OF PRESENT ILLNESS
[FreeTextEntry1] : Ms. Calvillo is a 70 year old female presenting to the office today for an follow-up visit. \par - she has been feeling well\par - no sour taste in the mouth\par - she has been sleeping well, about 7-8 hours \par - no SOB \par - weight has been stable \par - she has been having orthopedic issues with arm / shoulder \par - her sinuses have been okay allergy wise, shes much than she had been before. It has not been as bad.\par - she notes she walks at least 4-5 miles 3-4 times a week and every morning shes been doing yoga / aerobics \par - She  denies any visual issues, headaches, nausea, vomiting, fever, chills, sweats, chest pains, chest pressure, diarrhea, constipation, dysphagia, myalgia, dizziness, leg swelling, leg pain, itchy eyes, itchy ears, heartburn, reflux, or sour taste in the mouth.

## 2020-11-02 NOTE — ASSESSMENT
[FreeTextEntry1] : Ms. LUJAN is a 70 year old female with a history of colon cancer (stage 3) s/p chemotherapy and resection, HLD, heart murmur, osteopenia, and poor sleep who now comes to the office for a follow-up pulmonary evaluation  (stable).\par \par Her shortness of breath is multifactorial due to:\par -poor mechanics of breathing \par -out of shape / overweight\par -pulmonary disease\par -cardiac disease \par \par problem 1: asthma- stable\par -add Ventolin 2 puffs Q6H, pre-exercise \par -Asthma is believed to be caused by inherited (genetic) and environmental factor, but its exact cause is unknown. Asthma may be triggered by allergens, lung infections, or irritants in the air. Asthma triggers are different for each person \par -Inhaler technique reviewed as well as oral hygiene techniques reviewed with patient. Avoidance of cold air, extremes of temperature, rescue inhaler should be used before exercise. Order of medication reviewed with patient. Recommended use of a cool mist humidifier in the bedroom \par \par Problem 1A: Elevated IgE \par - candidate for dupixent\par Dupixent is a prescription medicine used with other asthma medicines for the maintenance treatment of moderate-to-severe asthma in people aged 12 years and older whose asthma is not controlled with their current asthma medicines. Dupixent helps prevent severe asthma attacks (exacerbations) and can improve your breathing. Dupixent may also help reduce the amount of oral corticosteroids you need while preventing severe asthma attacks and improving your breathing. Dupixent is not used to treat sudden breathing problems. Risks and side effect of Dupixent were discussed and reviewed with patient. \par \par Problem 1B: Eosinophilic asthma\par - candidate for nucala / fasenra\par -The safety and efficacy of Nucala was established in three double-blind, randomized, placebo-controlled trials in patients with severe asthma. Compared to a placebo, patients with severe asthma receiving Nucala had fewer exacerbation requiring hospitalization and/or emergency department visits, and a longer time to first exacerbation.  In addition, patients with severe asthma receiving Nucala or Fasenra experienced greater reductions in their daily maintenance oral corticosteroid dose, while maintaining asthma control compared with patients receiving placebo. Treatment with Nucala did not result in a significant improvement in lung function, as measured by the volume of air exhaled by patients in one second. The most common side effects include: headache, injection site reactions, back pain, weakness, and fatigue; hypersensitivity reactions can occur within hours or days including swelling of the face, mouth, and tongue, fainting, dizziness, hives, breathing problems, and rash; herpes zoster infections have occurred. The drug is a monoclonal antibody that inhibits interleukin-5 which helps regular eosinophils, a type of white blood cell that contributes to asthma. The over-production of eosinophils can cause inflammation in the lungs, increasing the frequency of asthma attacks. Patients must also take other medications, including high dose inhaled corticosteroids and at least one additional asthma drug\par  \par \par problem 2: allergy sinus \par -get Blood work to include: asthma panel (+), food IgE panel, IgE level (+), eosinophil level (+), vitamin D level \par -add Olopatadine 0.6% 1 sniff BID \par -Environmental measures for allergies were encouraged including mattress and pillow cover, air purifier, and environmental controls \par \par problem 3: poor sleep - r/o ?OSAS\par -get Home sleep study, if HSS c/w LOVELY, consider Oral Appliance \par Sleep apnea is associated with adverse clinical consequences which an affect most organ systems. Cardiovascular disease risk includes arrhythmias, atrial fibrillation, hypertension, coronary artery disease, and stroke. Metabolic disorders include diabetes type 2, non-alcoholic fatty liver disease. Mood disorder especially depression; and cognitive decline especially in the elderly. Associations with chronic reflux/Boswell’s esophagus some but not all inclusive. \par -Reasons include arousal consistent with hypopnea; respiratory events most prominent in REM sleep or supine position; therefore sleep staging and body position are important for accurate diagnosis and estimation of AHI.\par -Good sleep hygiene was encouraged including avoiding watching television an hour before bed, keeping caffeine at a low, avoiding reading, television, or anything, in bed, no drinking any liquids three hours before bedtime, and only getting into bed when tired and ready for sleep.\par \par problem 4: cardiac disease\par -recommended to continue to follow up with Cardiologist - Dr. Linwood Lambert\par \par problem 5: poor breathing mechanics\par -Proper breathing techniques were reviewed with an emphasis of exhalation. Patient instructed to breath in for 1 second and out for four seconds. Patient was encouraged to not talk while walking. \par \par problem 6: out of shape / overweight\par -Weight loss, exercise, and diet control were discussed and are highly encouraged. Treatment options were given such as, aqua therapy, and contacting a nutritionist. Recommended to use the elliptical, stationary bike, less use of treadmill. Mindful eating was explained to the patient Obesity is associated with worsening asthma, shortness of breath, and potential for cardiac disease, diabetes, and other underlying medical conditions\par \par Problem 7a: COVID-19 precautionary Immune Support Recommendations:\par -OTC Vitamin C 500mg BID \par -OTC Quercetin 250-500mg BID \par -OTC Zinc 75-100mg per day \par -OTC Melatonin 1 or 2mg a night \par -OTC Vitamin D 1-4000mg per day \par -OTC Tonic Water 8oz per day\par -Water 0.5-1 gallon per day\par \par problem 7: health maintenance \par -s/p yearly flu shot - 10/2020\par -recommended strep pneumonia vaccines: Prevnar-13 vaccine, followed by Pneumo vaccine 23 one year following\par -recommended early intervention for Upper Respiratory Infections (URIs)\par -recommended regular osteoporosis evaluations\par -recommended early dermatological evaluations\par -recommended after the age of 50 to the age of 70, colonoscopy every 5 years\par \par F/U in 3-4 months \par She is encouraged to call with any changes, concerns, or questions

## 2020-11-02 NOTE — PROCEDURE
[FreeTextEntry1] : FULL PFTs reveals very mild obstructive flows; FEV1 was  1.89 L which is  87 % of predicted; mid to low lung volumes; normal diffusion of  17.5 ,  which is  94% of predicted; normal flow volume loop\par \par  FENO was 26; normal value being less than 25\par Fractional exhaled nitric oxide (FENO) is regarded as a simple, noninvasive method for assessing eosinophilic airway inflammation. Produced by a variety of cells within the lung, nitric oxide (NO) concentrations are generally low in healthy individuals. However, high concentrations of NO appear to be involved in nonspecific host defense mechanisms and chronic inflammatory diseases such as asthma. The American Thoracic Society (ATS) therefore has recommended using FENO to aid in the diagnosis and monitoring of eosinophilic airway inflammation and asthma, and for identifying steroid responsive individuals whose chronic respiratory symptoms may be airway inflammation.\par

## 2020-11-02 NOTE — PHYSICAL EXAM
[No Acute Distress] : no acute distress [Well Nourished] : well nourished [Well Groomed] : well groomed [No Deformities] : no deformities [Well Developed] : well developed [Normal Oropharynx] : normal oropharynx [II] : Mallampati Class: II [Normal Appearance] : normal appearance [No Neck Mass] : no neck mass [Normal Rate/Rhythm] : normal rate/rhythm [Normal S1, S2] : normal s1, s2 [No Murmurs] : no murmurs [No Resp Distress] : no resp distress [Clear to Auscultation Bilaterally] : clear to auscultation bilaterally [No Abnormalities] : no abnormalities [Benign] : benign [Normal Gait] : normal gait [No Clubbing] : no clubbing [No Cyanosis] : no cyanosis [No Edema] : no edema [FROM] : FROM [Normal Color/ Pigmentation] : normal color/ pigmentation [No Focal Deficits] : no focal deficits [Oriented x3] : oriented x3 [Normal Affect] : normal affect [TextBox_68] : I:E 1:3, clear

## 2020-11-11 ENCOUNTER — APPOINTMENT (OUTPATIENT)
Dept: ORTHOPEDIC SURGERY | Facility: CLINIC | Age: 70
End: 2020-11-11
Payer: MEDICARE

## 2020-11-11 ENCOUNTER — APPOINTMENT (OUTPATIENT)
Dept: ORTHOPEDIC SURGERY | Facility: CLINIC | Age: 70
End: 2020-11-11

## 2020-11-11 VITALS — TEMPERATURE: 98.1 F

## 2020-11-11 DIAGNOSIS — M77.12 LATERAL EPICONDYLITIS, LEFT ELBOW: ICD-10-CM

## 2020-11-11 PROCEDURE — 99203 OFFICE O/P NEW LOW 30 MIN: CPT | Mod: 25

## 2020-11-11 PROCEDURE — 73080 X-RAY EXAM OF ELBOW: CPT | Mod: LT

## 2020-11-11 PROCEDURE — 20551 NJX 1 TENDON ORIGIN/INSJ: CPT | Mod: LT

## 2020-11-12 PROBLEM — M77.12 LATERAL EPICONDYLITIS OF LEFT ELBOW: Status: ACTIVE | Noted: 2020-11-12

## 2020-11-12 NOTE — ADDENDUM
[FreeTextEntry1] : This note was written by Luanne Hartman on 11/11/2020 acting solely as a scribe for Dr. Octavio Summers.\par \par All medical record entries made by the Scribe were at my, Dr. Octavio Summers, direction and personally dictated by me on 11/11/2020. I have personally reviewed the chart and agree that the record accurately reflects my personal performance of the history, physical exam, assessment and plan.

## 2020-11-12 NOTE — PROCEDURE
[de-identified] : Injection: Left elbow.\par Indication: Lateral epicondylitis. \par \par A discussion was had with the patient regarding this procedure and all questions were answered. All risks, benefits and alternatives were discussed. These included but were not limited to bleeding, infection, and allergic reaction. Alcohol was used to clean the skin, and Betadine was used to sterilize and prep the area in the lateral aspect of the left elbow.  Ethyl chloride spray was then used as a topical anesthetic. A 25-gauge needle was used to inject 2cc of 1% lidocaine and 1cc of 40mg/ml methylprednisolone into the left lateral epicondyle using a needling technique. A sterile bandage was then applied. The patient tolerated the procedure well and there were no complications.

## 2020-11-12 NOTE — HISTORY OF PRESENT ILLNESS
[de-identified] : 70 year old RHD female presents today with left elbow pain x 2 months. No injury reported, The pain is brought with rotation, lifting and extension. She is not taking pain mediation. Counterforce brace is minimally helpful. Patients states that her niece is a physical therapist and gave her some home exercises which is helpful. Denies numbness or tingling. HEP is not helpful. \par \par The patient's past medical history, past surgical history, medications and allergies were reviewed by me today with the patient and documented accordingly. In addition, the patient's family and social history, which were noncontributory to this visit, were reviewed also.

## 2020-11-12 NOTE — DISCUSSION/SUMMARY
[de-identified] : 70 year old female with left lateral epicondylitis. \par \par I discussed with the patient the treatment of lateral epicondylitis. Injection therapy was given to relieve therapeutic and symptomatic relief. I discussed that this is a degenerative condition rather than an inflammatory condition and that the process tends to be reversible (albeit can last from 6-24mos if untreated). The best source of treatment is to reduce the offending repetitive overuse injury process and I counseled the patient on how to do this. First line treatment can include watchful waiting for a period of 6-8 wks with specific activity modification and OTC NSAID's/Tylenol. Further treatment includes the use of counterforce bracing, physical therapy for stretching and strengthening, and the use of injection therapy (steroids/PRP etc). I also discussed the potential role of surgical intervention for chronic symptoms that persist greater than 6-12 months.\par \par At this time as treatment I recommended a period of relative rest, stretching and strengthening modalities as indicated in a patient handout provided as well as counterforce bracing. Corticosteroid injection provided today given early chronicity.\par \par We will see the patient back as needed.

## 2020-11-12 NOTE — PHYSICAL EXAM
[de-identified] : Oriented to time, place, person\par Mood: Normal\par Affect: Normal\par Appearance: Healthy, well appearing, no acute distress.\par Gait: Normal\par Assistive Devices: counterforce brace\par \par Left elbow exam\par \par Skin: Clean, dry, intact. No ecchymosis. No swelling. No palpable joint effusion.\par ROM: Full extension/flexion, full supination/pronation.\par Painful ROM: Lateral elbow pain with resisted wrist extension\par Tenderness: No medial epicondyle pain. Positive lateral epicondyle pain (ECRB). No olecranon pain. No pain at radial head. No pain to radial tunnel.\par Strength: 5/5 elbow flexion, 5/5 elbow extension, 5/5 supination, 5/5 pronation\par Stability: Stable to vaus/valgus stress\par Vasc: 2+ radial pulse, <2s cap refill\par Sensation: In tact to light touch throughout\par Neuro: Negative Tinel at ulnar canal, AIN/PIN/Ulnar nerve in tact to motor/sensation.  [de-identified] : The following radiographs were ordered and read by me during this patients visit. I reviewed each radiograph in detail with the patient and discussed the findings as highlighted below. \par \par 3 views of the left elbow were obtained today, 11/11/2020, that show no acute fracture or dislocation. There is no degenerative change seen. There is no gross malalignment. No significant other obvious osseous abnormality, otherwise unremarkable.

## 2021-01-21 ENCOUNTER — APPOINTMENT (OUTPATIENT)
Dept: GERIATRICS | Facility: CLINIC | Age: 71
End: 2021-01-21
Payer: MEDICARE

## 2021-01-21 VITALS
TEMPERATURE: 98 F | OXYGEN SATURATION: 99 % | BODY MASS INDEX: 24.71 KG/M2 | SYSTOLIC BLOOD PRESSURE: 114 MMHG | HEIGHT: 63.6 IN | RESPIRATION RATE: 16 BRPM | WEIGHT: 143 LBS | DIASTOLIC BLOOD PRESSURE: 70 MMHG | HEART RATE: 86 BPM

## 2021-01-21 DIAGNOSIS — C18.9 MALIGNANT NEOPLASM OF COLON, UNSPECIFIED: ICD-10-CM

## 2021-01-21 PROCEDURE — G0439: CPT

## 2021-01-21 RX ORDER — ZOLPIDEM TARTRATE 5 MG/1
5 TABLET ORAL
Qty: 30 | Refills: 0 | Status: DISCONTINUED | COMMUNITY
Start: 2019-10-28 | End: 2021-01-21

## 2021-01-21 NOTE — ASSESSMENT
[FreeTextEntry1] : \par hx colon cancer: in remission follows with Dr. Gallo\par \par \par annual breast exam completed today\par -script provided for mammo due in 7/2021\par \par counselled on weight bearing exercises, cognitive stimulation exercises

## 2021-01-21 NOTE — HISTORY OF PRESENT ILLNESS
[PMH Reviewed and Updated] : past medical history reviewed and updated [PSH Reviewed and Updated] : past surgical history reviewed and updated [Family History Reviewed and Updated] : family history reviewed and updated [Medication and Allergies Reconciled] : medication and allergies reconciled [0] : 0 [Spouse] : spouse [Retired] : retired from work [None] : The patient has no concerns about alcohol abuse [Compliant with medications] : compliant with medications [Advanced Directives Discussed] : discussed at today's visit [Patient Has Documented Advanced Directive/Health Proxy but did not bring paperwork to Office Visit] : Patient has documented Advanced Directive/Health Proxy but did not bring paperwork to office visit [FreeTextEntry1] : 69yo F with pmhx of HLD, congenital 1 kidney,  \par hx of colon cancer: s/p partial resection 12-14 inches 1999, s/p chemotherapy.\par Dr. Conroy GI:  last colonscopy 7/11/2018: polyps removed.  \par Dr. Venita aGllo: oncologist: \par no wt loss, bleeding, ab pain, or changes in stool.\par \par Bone density: 2017: Osteopenia ; 2020 osteopenia; repeat due in 2022\par currently taking vitamin D\par mammogram 7/2020: negative\par \par \par lives with \par 2 sons: live in [facility]\par works as CPA, retired.\par smoked 5 years, in 20's, \par no etoh use\par \par seasonal allergies has been worse lately.  denies ha, or sinus pressure currently.\par \par HLD:\par started on zetia by cardiology Oct 2020\par 10/2020 echo: with normal lvef \par 10/2020 carotid dopplers negative\par \par \par for sleep disturbance has been taking melatonin and cbd oil.\par \par macular disease worse in R, but also in left\par hx of retinal tear in left \par \par \par family hx: brothers with HLD, mother: no stroke, dm2, or heart disease.\par \par walks with different friend daily for exercise\par \par complains of forgetting words, but later she is able to remember\par independent in handling iadl's.\par

## 2021-01-21 NOTE — PHYSICAL EXAM
[General Appearance - Alert] : alert [General Appearance - In No Acute Distress] : in no acute distress [General Appearance - Well Nourished] : well nourished [Sclera] : the sclera and conjunctiva were normal [Extraocular Movements] : extraocular movements were intact [Neck Appearance] : the appearance of the neck was normal [Neck Cervical Mass (___cm)] : no neck mass was observed [Respiration, Rhythm And Depth] : normal respiratory rhythm and effort [Exaggerated Use Of Accessory Muscles For Inspiration] : no accessory muscle use [Auscultation Breath Sounds / Voice Sounds] : lungs were clear to auscultation bilaterally [Heart Rate And Rhythm] : heart rate was normal and rhythm regular [Edema] : there was no peripheral edema [Heart Sounds] : normal S1 and S2 [Bowel Sounds] : normal bowel sounds [Abdomen Soft] : soft [Abdomen Tenderness] : non-tender [No Spinal Tenderness] : no spinal tenderness [Nail Clubbing] : no clubbing  or cyanosis of the fingernails [Involuntary Movements] : no involuntary movements were seen [Musculoskeletal - Swelling] : no joint swelling seen [Skin Color & Pigmentation] : normal skin color and pigmentation [No Focal Deficits] : no focal deficits [Impaired Insight] : insight and judgment were intact [Affect] : the affect was normal [Mood] : the mood was normal [General Appearance - Well-Appearing] : healthy appearing [] : normal voice and communication [Outer Ear] : the ears and nose were normal in appearance [Both Tympanic Membranes Were Examined] : both tympanic membranes were normal [Jugular Venous Distention Increased] : there was no jugular-venous distention [Thyroid Diffuse Enlargement] : the thyroid was not enlarged [Thyroid Nodule] : there were no palpable thyroid nodules [Cervical Lymph Nodes Enlarged Posterior Bilaterally] : posterior cervical [Cervical Lymph Nodes Enlarged Anterior Bilaterally] : anterior cervical [Supraclavicular Lymph Nodes Enlarged Bilaterally] : supraclavicular [Axillary Lymph Nodes Enlarged Bilaterally] : axillary [Abnormal Walk] : normal gait

## 2021-01-21 NOTE — REVIEW OF SYSTEMS
[Eyesight Problems] : eyesight problems [Loss Of Hearing] : hearing loss [Constipation] : constipation [Sleep Disturbances] : sleep disturbances [Negative] : Heme/Lymph [Diarrhea] : no diarrhea

## 2021-01-21 NOTE — REASON FOR VISIT
[Subsequent Annual Medicare Wellness Visit] : a subsequent annual Medicare wellness visit [FreeTextEntry1] : annual

## 2021-02-04 ENCOUNTER — LABORATORY RESULT (OUTPATIENT)
Age: 71
End: 2021-02-04

## 2021-02-04 LAB — 25(OH)D3 SERPL-MCNC: 46.4 NG/ML

## 2021-02-09 ENCOUNTER — APPOINTMENT (OUTPATIENT)
Dept: CARDIOLOGY | Facility: CLINIC | Age: 71
End: 2021-02-09
Payer: MEDICARE

## 2021-02-09 VITALS
WEIGHT: 143 LBS | HEIGHT: 63 IN | DIASTOLIC BLOOD PRESSURE: 78 MMHG | RESPIRATION RATE: 15 BRPM | SYSTOLIC BLOOD PRESSURE: 125 MMHG | BODY MASS INDEX: 25.34 KG/M2 | HEART RATE: 60 BPM | TEMPERATURE: 97.7 F

## 2021-02-09 PROCEDURE — 99213 OFFICE O/P EST LOW 20 MIN: CPT

## 2021-03-02 ENCOUNTER — APPOINTMENT (OUTPATIENT)
Dept: PULMONOLOGY | Facility: CLINIC | Age: 71
End: 2021-03-02
Payer: MEDICARE

## 2021-03-02 VITALS
DIASTOLIC BLOOD PRESSURE: 80 MMHG | HEIGHT: 63 IN | HEART RATE: 87 BPM | RESPIRATION RATE: 15 BRPM | TEMPERATURE: 97.6 F | OXYGEN SATURATION: 97 % | BODY MASS INDEX: 25.52 KG/M2 | SYSTOLIC BLOOD PRESSURE: 120 MMHG | WEIGHT: 144 LBS

## 2021-03-02 PROCEDURE — 99214 OFFICE O/P EST MOD 30 MIN: CPT

## 2021-03-02 NOTE — ASSESSMENT
[FreeTextEntry1] : Ms. LUJAN is a 71 year old female with a history of colon cancer (stage 3) s/p chemotherapy and resection, HLD, heart murmur, osteopenia, and poor sleep who now comes to the office for a follow-up pulmonary evaluation  (stable). Her number one issue is allergies\par \par Her shortness of breath is multifactorial due to:\par -poor mechanics of breathing \par -out of shape / overweight\par -pulmonary disease\par -cardiac disease \par \par problem 1: asthma- stable\par -add Ventolin 2 puffs Q6H, pre-exercise \par -Asthma is believed to be caused by inherited (genetic) and environmental factor, but its exact cause is unknown. Asthma may be triggered by allergens, lung infections, or irritants in the air. Asthma triggers are different for each person \par -Inhaler technique reviewed as well as oral hygiene techniques reviewed with patient. Avoidance of cold air, extremes of temperature, rescue inhaler should be used before exercise. Order of medication reviewed with patient. Recommended use of a cool mist humidifier in the bedroom \par \par Problem 1A: Elevated IgE \par - candidate for dupixent\par Dupixent is a prescription medicine used with other asthma medicines for the maintenance treatment of moderate-to-severe asthma in people aged 12 years and older whose asthma is not controlled with their current asthma medicines. Dupixent helps prevent severe asthma attacks (exacerbations) and can improve your breathing. Dupixent may also help reduce the amount of oral corticosteroids you need while preventing severe asthma attacks and improving your breathing. Dupixent is not used to treat sudden breathing problems. Risks and side effect of Dupixent were discussed and reviewed with patient. \par \par Problem 1B: Eosinophilic asthma\par - candidate for nucala / fasenra\par -The safety and efficacy of Nucala was established in three double-blind, randomized, placebo-controlled trials in patients with severe asthma. Compared to a placebo, patients with severe asthma receiving Nucala had fewer exacerbation requiring hospitalization and/or emergency department visits, and a longer time to first exacerbation.  In addition, patients with severe asthma receiving Nucala or Fasenra experienced greater reductions in their daily maintenance oral corticosteroid dose, while maintaining asthma control compared with patients receiving placebo. Treatment with Nucala did not result in a significant improvement in lung function, as measured by the volume of air exhaled by patients in one second. The most common side effects include: headache, injection site reactions, back pain, weakness, and fatigue; hypersensitivity reactions can occur within hours or days including swelling of the face, mouth, and tongue, fainting, dizziness, hives, breathing problems, and rash; herpes zoster infections have occurred. The drug is a monoclonal antibody that inhibits interleukin-5 which helps regular eosinophils, a type of white blood cell that contributes to asthma. The over-production of eosinophils can cause inflammation in the lungs, increasing the frequency of asthma attacks. Patients must also take other medications, including high dose inhaled corticosteroids and at least one additional asthma drug\par  \par \par problem 2: allergy sinus \par -get Blood work to include: asthma panel (+), food IgE panel, IgE level (+), eosinophil level (+), vitamin D level \par -continue Olopatadine 0.6% 1 sniff BID / Qnasl 1 sniff BID \par -Add Clarinex 5 mg before bed \par -Environmental measures for allergies were encouraged including mattress and pillow cover, air purifier, and environmental controls \par \par problem 3: poor sleep - r/o ?OSAS\par -get Home sleep study, if HSS c/w LOVELY, consider Oral Appliance \par Sleep apnea is associated with adverse clinical consequences which an affect most organ systems. Cardiovascular disease risk includes arrhythmias, atrial fibrillation, hypertension, coronary artery disease, and stroke. Metabolic disorders include diabetes type 2, non-alcoholic fatty liver disease. Mood disorder especially depression; and cognitive decline especially in the elderly. Associations with chronic reflux/Boswell’s esophagus some but not all inclusive. \par -Reasons include arousal consistent with hypopnea; respiratory events most prominent in REM sleep or supine position; therefore sleep staging and body position are important for accurate diagnosis and estimation of AHI.\par -Good sleep hygiene was encouraged including avoiding watching television an hour before bed, keeping caffeine at a low, avoiding reading, television, or anything, in bed, no drinking any liquids three hours before bedtime, and only getting into bed when tired and ready for sleep.\par \par problem 4: cardiac disease\par -recommended to continue to follow up with Cardiologist - Dr. Linwood Lambert\par \par problem 5: poor breathing mechanics\par -Proper breathing techniques were reviewed with an emphasis of exhalation. Patient instructed to breath in for 1 second and out for four seconds. Patient was encouraged to not talk while walking. \par \par problem 6: out of shape / overweight\par -Weight loss, exercise, and diet control were discussed and are highly encouraged. Treatment options were given such as, aqua therapy, and contacting a nutritionist. Recommended to use the elliptical, stationary bike, less use of treadmill. Mindful eating was explained to the patient Obesity is associated with worsening asthma, shortness of breath, and potential for cardiac disease, diabetes, and other underlying medical conditions\par \par Problem 7a: COVID-19 precautionary Immune Support Recommendations:\par -OTC Vitamin C 500mg BID \par -OTC Quercetin 250-500mg BID \par -OTC Zinc 75-100mg per day \par -OTC Melatonin 1 or 2mg a night \par -OTC Vitamin D 1-4000mg per day \par -OTC Tonic Water 8oz per day\par -Water 0.5-1 gallon per day\par \par problem 7: health maintenance \par -s/p yearly flu shot - 10/2020\par -recommended strep pneumonia vaccines: Prevnar-13 vaccine, followed by Pneumo vaccine 23 one year following\par -recommended early intervention for Upper Respiratory Infections (URIs)\par -recommended regular osteoporosis evaluations\par -recommended early dermatological evaluations\par -recommended after the age of 50 to the age of 70, colonoscopy every 5 years\par \par F/U in 3-4 months \par She is encouraged to call with any changes, concerns, or questions

## 2021-03-02 NOTE — ADDENDUM
[FreeTextEntry1] : Documented by Bernard Jacinto acting as a scribe for Dr. Dony Mar on 03/02/2021.\par \par All medical record entries made by the Scribe were at my, Dr. Dony Mar's, direction and personally dictated by me on 03/02/2021. I have reviewed the chart and agree that the record accurately reflects my personal performance of the history, physical exam, assessment and plan. I have also personally directed, reviewed, and agree with the discharge instructions.

## 2021-03-02 NOTE — PHYSICAL EXAM
[No Acute Distress] : no acute distress [Well Nourished] : well nourished [Well Groomed] : well groomed [No Deformities] : no deformities [Well Developed] : well developed [Normal Oropharynx] : normal oropharynx [Normal Appearance] : normal appearance [No Neck Mass] : no neck mass [Normal Rate/Rhythm] : normal rate/rhythm [Normal S1, S2] : normal s1, s2 [No Murmurs] : no murmurs [No Resp Distress] : no resp distress [Clear to Auscultation Bilaterally] : clear to auscultation bilaterally [No Abnormalities] : no abnormalities [Benign] : benign [Normal Gait] : normal gait [No Clubbing] : no clubbing [No Cyanosis] : no cyanosis [No Edema] : no edema [FROM] : FROM [Normal Color/ Pigmentation] : normal color/ pigmentation [No Focal Deficits] : no focal deficits [Oriented x3] : oriented x3 [Normal Affect] : normal affect [III] : Mallampati Class: III [TextBox_[id number]] : I:E 1:3, clear

## 2021-03-02 NOTE — HISTORY OF PRESENT ILLNESS
[FreeTextEntry1] : Ms. Calvilol is a 71 year old female with a history of asthma, allergies, WILLIS, elevated IgE, eosinophilic asthma, snoring, and SOB presenting to the office today for an follow-up visit. Her chief complaint is constant rhinorrhea and allergies\par -she reports feeling generally well\par -she notes her allergy sx are constant throughout the year. She has sx of itchy nose, constant sneezing. She has been using Olopatadine, and works for her except it occasionally stops working. She took Ipratropium bromide spray, which helped\par -she reports her weight is stable, and walks 4 miles per day for exercise\par -she states her energy level is 8/10\par -she reports her sleep quality varies, and has difficulty stopping thinking at night. She notes he  wakes her up at night as well\par -She notes Her bowels are regular\par -she reports she wakes up 2x with nocturia\par -she denies any chest pain, chest pressure, diarrhea, constipation, dysphagia, dizziness, sour taste in the mouth, heartburn, reflux, myalgias or arthralgias. \par \par

## 2021-03-02 NOTE — REVIEW OF SYSTEMS
[Negative] : Endocrine [Nasal Discharge] : nasal discharge [Chest Discomfort] : no chest discomfort [GERD] : no gerd [Diarrhea] : no diarrhea [Constipation] : no constipation [Dysphagia] : no dysphagia [Dizziness] : no dizziness

## 2021-04-19 ENCOUNTER — LABORATORY RESULT (OUTPATIENT)
Age: 71
End: 2021-04-19

## 2021-04-20 ENCOUNTER — APPOINTMENT (OUTPATIENT)
Dept: ORTHOPEDIC SURGERY | Facility: CLINIC | Age: 71
End: 2021-04-20

## 2021-04-27 ENCOUNTER — NON-APPOINTMENT (OUTPATIENT)
Age: 71
End: 2021-04-27

## 2021-04-27 ENCOUNTER — APPOINTMENT (OUTPATIENT)
Dept: CARDIOLOGY | Facility: CLINIC | Age: 71
End: 2021-04-27
Payer: MEDICARE

## 2021-04-27 VITALS
WEIGHT: 144 LBS | HEART RATE: 94 BPM | SYSTOLIC BLOOD PRESSURE: 135 MMHG | DIASTOLIC BLOOD PRESSURE: 85 MMHG | TEMPERATURE: 97.3 F | HEIGHT: 63 IN | RESPIRATION RATE: 16 BRPM | OXYGEN SATURATION: 96 % | BODY MASS INDEX: 25.52 KG/M2

## 2021-04-27 PROCEDURE — 99214 OFFICE O/P EST MOD 30 MIN: CPT

## 2021-04-27 PROCEDURE — 93000 ELECTROCARDIOGRAM COMPLETE: CPT

## 2021-04-27 NOTE — ASSESSMENT
[FreeTextEntry1] : This is a 70 year-old-female history of colon cancer, hyperlipidemia, comes in for followup cardiac evaluation.  Patient had bloodwork done on January 27, 2020; results remarkable for cholesterol of 288, LDL direct of 199, GFR of 48, and TSH of 5.02. Patient will be started on Crestor 20 mg qd, and will follow-up in 6 weeks.

## 2021-04-27 NOTE — DISCUSSION/SUMMARY
[FreeTextEntry1] : This is a 71-year-old female past medical history significant for minor coronary artery calcification, probable heterozygous familial hypercholesterolemia, history of colon cancer, murmurs, comes in for cardiac follow up evaluation. She denies chest pain, shortness of breath, dizziness or syncope.\par The patient is complaining of various joint aches.  She feels it may be secondary to her Pravachol therapy.  It is unclear if this is a causative factor.  She wishes to discontinue the Pravachol for now, she will remain on Zetia therapy.  She will contact me in 1 month to see if her symptoms improve.  I would then consider rechallenging her with Pravachol therapy to see if they return.\par Electrocardiogram done April 27, 2021 demonstrates normal sinus rhythm rate 74 bpm is otherwise unremarkable.\par Blood work done April 19, 2021, demonstrated cholesterol 157, HDL 69, triglycerides of 56, and LDL direct of 77 mg/dL which is at target level for FH, with an LDL target of less than 100 mg/dL.  She will follow-up with me after the above-noted statin vacation is completed\par Lifestyle modification including regular aerobic activity and dietary changes was reinforced.\par \par PMH:\par The patient's lipid panel is still not at goal.  She does not wish to be on PCSK9 inhibitors.\par She will continue on Zetia 10 mg/day.  I have discussed with her rechallenging her with Pravachol 40 mg daily to further reduce her LDL cholesterol.  She is willing to try this and continue on Zetia therapy.  She will have new blood work in 6 weeks and follow-up with me after that is completed.\par  She will have new blood work done in 4 to 6 weeks.  She will follow-up with me in 3 months.\par Lifestyle modification including diet and exercise were all reinforced.\par \par PMH:\par The patient is tolerating Crestor 20 mg per day.  She had blood work done March 16, 2020 which demonstrated a calculated LDL of 76 mg/dL, cholesterol 136 mg/dL, HDL 42 mg/dL, and triglycerides of 91 mg/dL.  This was a marked improvement over her previous blood work from January 30, 2020 which demonstrated a total cholesterol of 288 mg/dL, calculated  mg/dL and a direct LDL of 197 mg/dL.  The patient has had a previous elevation of total cholesterol to 301 mg/dL with an LDL of 194 mg/dL in December 2018.\par The patient does not wish to be on a 20 mg dose of Crestor despite my recommendation of continuing her on the current dosage.  I have explained to her that she is a cholesterol goal for patient with heterozygous familial hypercholesterolemia.  She does not wish to stay on the 20 mg dose and will change to Crestor 10 mg per day and continue a prudent diet.  She will also work with the registered dietitian.\par PMH:\par The patient had blood work done January 13, 2020 which demonstrated total cholesterol of 288 mg/dL, HDL 64 mg/dL, calculated  mg/dL, triglycerides under 26 mg/dL and a direct LDL cholesterol 197 mg/dL.  \par The patient had a coronary artery calcium score done May 14, 2019 which demonstrated a score of 3 units noted in the left anterior descending artery.\par The patient had a normal exercise stress test 12/5/2018. \par \par She has significantly elevated total cholesterol, with an LDL cholesterol 194 mg/dL, total cholesterol 301 mg/dL. \par She does have a family history of hypercholesterolemia involving siblings. She also reports that one of her 2 children and high cholesterol as well. \par I have explained to her that her children and siblings may have familial hypercholesterolemia should be screened as well.  They may require medical therapy to\par The patient's cardiac risk factors include history of hypercholesterolemia. She reports that both brothers also high cholesterol.\par \par Blood work done by her primary care physician July 13, 2018 demonstrated total cholesterol 273 mg/dL, LDL calculated cholesterol 193 mg/dL, HDL 55 mg/dL, triglyceride 126 mg/dL, hemoglobin A1c of 5.4.\par The finding of LDL cholesterol greater than 190 is consistent with familial heterozygous hypercholesterolemia. The patient reports that she has had LDL cholesterols in the past of 140 mg/dL. A measure of direct LDL cholesterol is more accurate as usually higher than the calculated number. Blood work was done today for direct LDL cholesterol.\par \par Patient is instructed to follow with you regarding her overall medical care.

## 2021-04-27 NOTE — PHYSICAL EXAM
[Well Developed] : well developed [Well Nourished] : well nourished [No Acute Distress] : no acute distress [Normal Venous Pressure] : normal venous pressure [No Carotid Bruit] : no carotid bruit [Normal S1, S2] : normal S1, S2 [No Rub] : no rub [Murmur] : murmur [Rhythm Regular] : regular [No Gallop] : no gallop heard [I] : a grade 1 [Clear Lung Fields] : clear lung fields [Good Air Entry] : good air entry [No Respiratory Distress] : no respiratory distress  [Soft] : abdomen soft [Non Tender] : non-tender [No Masses/organomegaly] : no masses/organomegaly [Normal Bowel Sounds] : normal bowel sounds [Normal Gait] : normal gait [No Edema] : no edema [No Cyanosis] : no cyanosis [No Clubbing] : no clubbing [No Varicosities] : no varicosities [No Rash] : no rash [No Skin Lesions] : no skin lesions [Moves all extremities] : moves all extremities [No Focal Deficits] : no focal deficits [Normal Speech] : normal speech [Alert and Oriented] : alert and oriented [Normal memory] : normal memory [General Appearance - Well Developed] : well developed [Normal Appearance] : normal appearance [Well Groomed] : well groomed [General Appearance - Well Nourished] : well nourished [No Deformities] : no deformities [General Appearance - In No Acute Distress] : no acute distress [Normal Conjunctiva] : the conjunctiva exhibited no abnormalities [Normal Oral Mucosa] : normal oral mucosa [No Oral Pallor] : no oral pallor [Normal Oropharynx] : normal oropharynx [Normal Jugular Venous A Waves Present] : normal jugular venous A waves present [Normal Jugular Venous V Waves Present] : normal jugular venous V waves present [No Jugular Venous Browne A Waves] : no jugular venous browne A waves [Exaggerated Use Of Accessory Muscles For Inspiration] : no accessory muscle use [5th Left ICS - MCL] : palpated at the 5th LICS in the midclavicular line [Normal] : normal [No Precordial Heave] : no precordial heave was noted [Normal Rate] : normal [Normal S1] : normal S1 [Normal S2] : normal S2 [No Murmur] : no murmurs heard [2+] : left 2+ [No Abnormalities] : the abdominal aorta was not enlarged and no bruit was heard [No Pitting Edema] : no pitting edema present [Bowel Sounds] : normal bowel sounds [Abdomen Soft] : soft [Abnormal Walk] : normal gait [Gait - Sufficient For Exercise Testing] : the gait was sufficient for exercise testing [Nail Clubbing] : no clubbing of the fingernails [Cyanosis, Localized] : no localized cyanosis [Skin Color & Pigmentation] : normal skin color and pigmentation [Skin Turgor] : normal skin turgor [] : no rash [Oriented To Time, Place, And Person] : oriented to person, place, and time [Affect] : the affect was normal [Mood] : the mood was normal [Apical Thrill] : no thrill palpable at the apex [Click] : no click [Distant] : the heart sounds were ~L not distant [Pericardial Rub] : no pericardial rub [S3] : no S3 [S4] : no S4 [Right Carotid Bruit] : no bruit heard over the right carotid [Left Carotid Bruit] : no bruit heard over the left carotid [Right Femoral Bruit] : no bruit heard over the right femoral artery [Left Femoral Bruit] : no bruit heard over the left femoral artery

## 2021-04-27 NOTE — REASON FOR VISIT
[CV Risk Factors and Non-Cardiac Disease] : CV risk factors and non-cardiac disease [Coronary Artery Disease] : coronary artery disease [Follow-Up - Clinic] : a clinic follow-up of [Hyperlipidemia] : hyperlipidemia [FreeTextEntry1] : murmur

## 2021-04-28 RX ORDER — ALBUTEROL SULFATE 90 UG/1
108 (90 BASE) AEROSOL, METERED RESPIRATORY (INHALATION) EVERY 6 HOURS
Qty: 1 | Refills: 5 | Status: COMPLETED | COMMUNITY
Start: 2019-11-21 | End: 2021-04-28

## 2021-04-28 RX ORDER — ASPIRIN ENTERIC COATED TABLETS 81 MG 81 MG/1
81 TABLET, DELAYED RELEASE ORAL
Refills: 0 | Status: COMPLETED | COMMUNITY
Start: 2020-02-04 | End: 2021-04-28

## 2021-04-28 RX ORDER — DESLORATADINE 5 MG/1
5 TABLET ORAL DAILY
Qty: 90 | Refills: 1 | Status: COMPLETED | COMMUNITY
Start: 2021-03-02 | End: 2021-04-28

## 2021-04-28 RX ORDER — IPRATROPIUM BROMIDE 42 UG/1
0.06 SPRAY NASAL 3 TIMES DAILY
Qty: 3 | Refills: 1 | Status: COMPLETED | COMMUNITY
Start: 2021-03-02 | End: 2021-04-28

## 2021-06-08 ENCOUNTER — TRANSCRIPTION ENCOUNTER (OUTPATIENT)
Age: 71
End: 2021-06-08

## 2021-06-15 ENCOUNTER — APPOINTMENT (OUTPATIENT)
Dept: PULMONOLOGY | Facility: CLINIC | Age: 71
End: 2021-06-15
Payer: MEDICARE

## 2021-06-15 VITALS
TEMPERATURE: 97.4 F | OXYGEN SATURATION: 97 % | RESPIRATION RATE: 16 BRPM | HEART RATE: 80 BPM | WEIGHT: 142 LBS | DIASTOLIC BLOOD PRESSURE: 80 MMHG | BODY MASS INDEX: 25.16 KG/M2 | SYSTOLIC BLOOD PRESSURE: 110 MMHG | HEIGHT: 63 IN

## 2021-06-15 PROCEDURE — ZZZZZ: CPT

## 2021-06-15 PROCEDURE — 94010 BREATHING CAPACITY TEST: CPT

## 2021-06-15 PROCEDURE — 95012 NITRIC OXIDE EXP GAS DETER: CPT

## 2021-06-15 PROCEDURE — 94729 DIFFUSING CAPACITY: CPT

## 2021-06-15 PROCEDURE — 94727 GAS DIL/WSHOT DETER LNG VOL: CPT

## 2021-06-15 PROCEDURE — 99214 OFFICE O/P EST MOD 30 MIN: CPT | Mod: 25

## 2021-06-15 NOTE — PROCEDURE
[FreeTextEntry1] : Full PFT revealed mild to moderate obstructive dysfunction, with a FEV1 of 1.67L, which is 79% of predicted, normal lung volumes, and a diffusion of 18, which is 99% of predicted, with a normal flow volume loop.\par \par -Images and procedures reviewed in detail and discussed with patient.

## 2021-06-15 NOTE — HISTORY OF PRESENT ILLNESS
[FreeTextEntry1] : Ms. Calvillo is a 71 year old female with a history of asthma, allergies, WILLIS, elevated IgE, eosinophilic asthma, snoring, and SOB presenting to the office today for an follow-up visit. Her chief complaint is \par -She notes doing well in general \par -She denies getting enough sleep \par -She notes dealing with an illness in her family which is bringing on stress\par -She notes exercising frequently, walking 4 miles 4x per week\par -She notes some visual issues but is well maintained\par -She notes occasionally taking Xanax to help her sleep\par -She notes her weight is stable \par -she notes "Tennis Elbow: on her left side. She is s/p cortisone shot a few months ago and is now starting to feel pain again \par -She notes QNasl helping her sinuses stay clear, but her insurance will not cover it anymore\par -She notes now taking QNasl 1x per day \par -She notes when not taking her inhaler, then feeling her breathing change \par - S/p Covid 19 vaccine (Moderna) x2 with a reaction to the second vaccine \par \par - patient denies any headaches, nausea, vomiting, fever, chills, sweats, chest pain, chest pressure, palpitations, coughing, wheezing, fatigue, diarrhea, constipation, dysphagia, myalgias, dizziness, leg swelling, leg pain, itchy eyes, itchy ears, heartburn, reflux or sour taste in the mouth

## 2021-06-15 NOTE — ADDENDUM
[FreeTextEntry1] : Documented by Bernard Bates acting as a scribe for Dr. Dony Mar on (06/15/2021).\par \par All medical record entries made by the Scribe were at my, Dr. Dony Mar's, direction and personally dictated by me on (06/15/2021). I have reviewed the chart and agree that the record accurately reflects my personal performance of the history, physical exam, assessment and plan. I have also personally directed, reviewed, and agree with the discharge instructions.\par

## 2021-06-15 NOTE — ASSESSMENT
[FreeTextEntry1] : Ms. LUJAN is a 71 year old female with a history of colon cancer (stage 3) s/p chemotherapy and resection, HLD, heart murmur, osteopenia, and poor sleep who now comes to the office for a follow-up pulmonary evaluation  (stable). Her number one issue is allergies / Tennis elbow\par \par Her shortness of breath is multifactorial due to:\par -poor mechanics of breathing \par -out of shape / overweight\par -pulmonary disease\par -cardiac disease \par \par problem 1: moderate  asthma- stable\par -add Ventolin 2 puffs Q6H, pre-exercise \par -Asthma is believed to be caused by inherited (genetic) and environmental factor, but its exact cause is unknown. Asthma may be triggered by allergens, lung infections, or irritants in the air. Asthma triggers are different for each person \par -Inhaler technique reviewed as well as oral hygiene techniques reviewed with patient. Avoidance of cold air, extremes of temperature, rescue inhaler should be used before exercise. Order of medication reviewed with patient. Recommended use of a cool mist humidifier in the bedroom \par \par Problem 1A: Elevated IgE \par - candidate for dupixent\par Dupixent is a prescription medicine used with other asthma medicines for the maintenance treatment of moderate-to-severe asthma in people aged 12 years and older whose asthma is not controlled with their current asthma medicines. Dupixent helps prevent severe asthma attacks (exacerbations) and can improve your breathing. Dupixent may also help reduce the amount of oral corticosteroids you need while preventing severe asthma attacks and improving your breathing. Dupixent is not used to treat sudden breathing problems. Risks and side effect of Dupixent were discussed and reviewed with patient. \par \par Problem 1B: Eosinophilic asthma\par - candidate for nucala / fasenra\par -The safety and efficacy of Nucala was established in three double-blind, randomized, placebo-controlled trials in patients with severe asthma. Compared to a placebo, patients with severe asthma receiving Nucala had fewer exacerbation requiring hospitalization and/or emergency department visits, and a longer time to first exacerbation.  In addition, patients with severe asthma receiving Nucala or Fasenra experienced greater reductions in their daily maintenance oral corticosteroid dose, while maintaining asthma control compared with patients receiving placebo. Treatment with Nucala did not result in a significant improvement in lung function, as measured by the volume of air exhaled by patients in one second. The most common side effects include: headache, injection site reactions, back pain, weakness, and fatigue; hypersensitivity reactions can occur within hours or days including swelling of the face, mouth, and tongue, fainting, dizziness, hives, breathing problems, and rash; herpes zoster infections have occurred. The drug is a monoclonal antibody that inhibits interleukin-5 which helps regular eosinophils, a type of white blood cell that contributes to asthma. The over-production of eosinophils can cause inflammation in the lungs, increasing the frequency of asthma attacks. Patients must also take other medications, including high dose inhaled corticosteroids and at least one additional asthma drug\par  \par \par problem 2: allergy sinus \par -s/p Blood work to include: asthma panel (+), food IgE panel, IgE level (+), eosinophil level (+), vitamin D level \par -continue Olopatadine 0.6% 1 sniff BID / Qnasl 1 sniff BID \par -Add Clarinex 5 mg before bed \par -Environmental measures for allergies were encouraged including mattress and pillow cover, air purifier, and environmental controls \par \par problem 3: poor sleep - r/o ?OSAS\par -get Home sleep study, if HSS c/w LOVELY, consider Oral Appliance \par Sleep apnea is associated with adverse clinical consequences which an affect most organ systems. Cardiovascular disease risk includes arrhythmias, atrial fibrillation, hypertension, coronary artery disease, and stroke. Metabolic disorders include diabetes type 2, non-alcoholic fatty liver disease. Mood disorder especially depression; and cognitive decline especially in the elderly. Associations with chronic reflux/Boswell’s esophagus some but not all inclusive. \par -Reasons include arousal consistent with hypopnea; respiratory events most prominent in REM sleep or supine position; therefore sleep staging and body position are important for accurate diagnosis and estimation of AHI.\par -Good sleep hygiene was encouraged including avoiding watching television an hour before bed, keeping caffeine at a low, avoiding reading, television, or anything, in bed, no drinking any liquids three hours before bedtime, and only getting into bed when tired and ready for sleep.\par \par problem 4: cardiac disease\par -recommended to continue to follow up with Cardiologist - Dr. Linwood Lambert\par \par problem 5: poor breathing mechanics\par -Proper breathing techniques were reviewed with an emphasis of exhalation. Patient instructed to breath in for 1 second and out for four seconds. Patient was encouraged to not talk while walking. \par \par problem 6: out of shape / overweight\par -Weight loss, exercise, and diet control were discussed and are highly encouraged. Treatment options were given such as, aqua therapy, and contacting a nutritionist. Recommended to use the elliptical, stationary bike, less use of treadmill. Mindful eating was explained to the patient Obesity is associated with worsening asthma, shortness of breath, and potential for cardiac disease, diabetes, and other underlying medical conditions\par \par Problem 7a: COVID-19 precautionary Immune Support Recommendations:\par - S/p Covid 19 vaccine (Moderna) x2 \par -OTC Vitamin C 500mg BID \par -OTC Quercetin 250-500mg BID \par -OTC Zinc 75-100mg per day \par -OTC Melatonin 1 or 2mg a night \par -OTC Vitamin D 1-4000mg per day \par -OTC Tonic Water 8oz per day\par -Water 0.5-1 gallon per day\par \par problem 7: health maintenance \par -s/p yearly flu shot - 10/2020\par -recommended strep pneumonia vaccines: Prevnar-13 vaccine, followed by Pneumo vaccine 23 one year following\par -recommended early intervention for Upper Respiratory Infections (URIs)\par -recommended regular osteoporosis evaluations\par -recommended early dermatological evaluations\par -recommended after the age of 50 to the age of 70, colonoscopy every 5 years\par \par F/U in 3-4 months \par She is encouraged to call with any changes, concerns, or questions

## 2021-06-15 NOTE — PHYSICAL EXAM
[No Acute Distress] : no acute distress [Well Nourished] : well nourished [Well Groomed] : well groomed [No Deformities] : no deformities [Well Developed] : well developed [Normal Oropharynx] : normal oropharynx [II] : Mallampati Class: II [No Neck Mass] : no neck mass [Normal Appearance] : normal appearance [Normal Rate/Rhythm] : normal rate/rhythm [Normal S1, S2] : normal s1, s2 [No Murmurs] : no murmurs [No Resp Distress] : no resp distress [Clear to Auscultation Bilaterally] : clear to auscultation bilaterally [No Abnormalities] : no abnormalities [Benign] : benign [Normal Gait] : normal gait [No Clubbing] : no clubbing [No Cyanosis] : no cyanosis [No Edema] : no edema [FROM] : FROM [Normal Color/ Pigmentation] : normal color/ pigmentation [No Focal Deficits] : no focal deficits [Oriented x3] : oriented x3 [Normal Affect] : normal affect [TextBox_68] : I:E 1:3, clear

## 2021-10-05 ENCOUNTER — TRANSCRIPTION ENCOUNTER (OUTPATIENT)
Age: 71
End: 2021-10-05

## 2021-11-01 ENCOUNTER — LABORATORY RESULT (OUTPATIENT)
Age: 71
End: 2021-11-01

## 2021-11-09 ENCOUNTER — APPOINTMENT (OUTPATIENT)
Dept: CARDIOLOGY | Facility: CLINIC | Age: 71
End: 2021-11-09
Payer: MEDICARE

## 2021-11-09 ENCOUNTER — NON-APPOINTMENT (OUTPATIENT)
Age: 71
End: 2021-11-09

## 2021-11-09 VITALS
OXYGEN SATURATION: 99 % | HEIGHT: 63 IN | HEART RATE: 82 BPM | TEMPERATURE: 97.3 F | WEIGHT: 143 LBS | BODY MASS INDEX: 25.34 KG/M2 | RESPIRATION RATE: 16 BRPM

## 2021-11-09 DIAGNOSIS — I73.9 PERIPHERAL VASCULAR DISEASE, UNSPECIFIED: ICD-10-CM

## 2021-11-09 PROCEDURE — 93922 UPR/L XTREMITY ART 2 LEVELS: CPT

## 2021-11-09 PROCEDURE — 93000 ELECTROCARDIOGRAM COMPLETE: CPT

## 2021-11-09 PROCEDURE — 99214 OFFICE O/P EST MOD 30 MIN: CPT

## 2021-11-09 NOTE — PHYSICAL EXAM
[Well Developed] : well developed [Well Nourished] : well nourished [No Acute Distress] : no acute distress [Normal Venous Pressure] : normal venous pressure [No Carotid Bruit] : no carotid bruit [Normal S1, S2] : normal S1, S2 [No Rub] : no rub [Murmur] : murmur [Rhythm Regular] : regular [No Gallop] : no gallop heard [I] : a grade 1 [Clear Lung Fields] : clear lung fields [Good Air Entry] : good air entry [No Respiratory Distress] : no respiratory distress  [Soft] : abdomen soft [Non Tender] : non-tender [No Masses/organomegaly] : no masses/organomegaly [Normal Bowel Sounds] : normal bowel sounds [Normal Gait] : normal gait [No Edema] : no edema [No Cyanosis] : no cyanosis [No Clubbing] : no clubbing [No Varicosities] : no varicosities [No Rash] : no rash [No Skin Lesions] : no skin lesions [Moves all extremities] : moves all extremities [No Focal Deficits] : no focal deficits [Normal Speech] : normal speech [Alert and Oriented] : alert and oriented [Normal memory] : normal memory [General Appearance - Well Developed] : well developed [Normal Appearance] : normal appearance [Well Groomed] : well groomed [General Appearance - Well Nourished] : well nourished [No Deformities] : no deformities [General Appearance - In No Acute Distress] : no acute distress [Normal Conjunctiva] : the conjunctiva exhibited no abnormalities [Normal Oral Mucosa] : normal oral mucosa [No Oral Pallor] : no oral pallor [Normal Oropharynx] : normal oropharynx [Normal Jugular Venous A Waves Present] : normal jugular venous A waves present [Normal Jugular Venous V Waves Present] : normal jugular venous V waves present [No Jugular Venous Browne A Waves] : no jugular venous browne A waves [Exaggerated Use Of Accessory Muscles For Inspiration] : no accessory muscle use [5th Left ICS - MCL] : palpated at the 5th LICS in the midclavicular line [Normal] : normal [No Precordial Heave] : no precordial heave was noted [Normal Rate] : normal [Normal S1] : normal S1 [Normal S2] : normal S2 [No Murmur] : no murmurs heard [2+] : left 2+ [No Abnormalities] : the abdominal aorta was not enlarged and no bruit was heard [No Pitting Edema] : no pitting edema present [Bowel Sounds] : normal bowel sounds [Abdomen Soft] : soft [Abnormal Walk] : normal gait [Gait - Sufficient For Exercise Testing] : the gait was sufficient for exercise testing [Nail Clubbing] : no clubbing of the fingernails [Cyanosis, Localized] : no localized cyanosis [Skin Color & Pigmentation] : normal skin color and pigmentation [Skin Turgor] : normal skin turgor [] : no rash [Oriented To Time, Place, And Person] : oriented to person, place, and time [Affect] : the affect was normal [Mood] : the mood was normal [Apical Thrill] : no thrill palpable at the apex [Click] : no click [Pericardial Rub] : no pericardial rub [S3] : no S3 [S4] : no S4 [Right Carotid Bruit] : no bruit heard over the right carotid [Left Carotid Bruit] : no bruit heard over the left carotid [Right Femoral Bruit] : no bruit heard over the right femoral artery [Left Femoral Bruit] : no bruit heard over the left femoral artery

## 2021-11-10 PROBLEM — I73.9 CLAUDICATION: Status: ACTIVE | Noted: 2021-11-09

## 2021-12-08 NOTE — DISCUSSION/SUMMARY
[FreeTextEntry1] : Above discussed with Dr. Maguire. 
[FreeTextEntry1] : This is a 71-year-old female past medical history significant for minor coronary artery calcification, probable heterozygous familial hypercholesterolemia, history of colon cancer, murmurs, comes in for cardiac follow up evaluation. She denies chest pain, shortness of breath, dizziness or syncope.\par Cardiac risk factors include hypercholesterolemia, ( both brothers also high cholesterol), and prediabetes.\par Electrocardiogram done November 9, 2021 demonstrate normal sinus rhythm rate of 82 bpm is otherwise unremarkable.\par Lipid panel done November 1, 2021 demonstrated cholesterol 156, HDL 63, LDL calculated 73 mg/dL, and triglycerides 97 mg/dL, non-HDL cholesterol 92 mg/dL, and direct LDL cholesterol of 72 mg/dL.  Hemoglobin A1c was 6.0 consistent with prediabetes.\par Given her cardiac risk profile, a ankle-brachial index done November 9, 2021 was within normal limits.\par The patient has no further "muscle aches or joint aches on combination therapy with Pravachol and Zetia.\par The patient had a coronary artery calcium score done May 14, 2019 which demonstrated a score of 3 units noted in the left anterior descending artery.\par The patient had a normal exercise stress test 12/5/2018. \par Electrocardiogram done April 27, 2021 demonstrates normal sinus rhythm rate 74 bpm is otherwise unremarkable.\par Blood work done April 19, 2021, demonstrated cholesterol 157, HDL 69, triglycerides of 56, and LDL direct of 77 mg/dL which is at target level for FH, with an LDL target of less than 100 mg/dL.  She will follow-up with me after the above-noted statin vacation is completed\par Lifestyle modification including regular aerobic activity and dietary changes was reinforced.\par PMH:\par The patient is tolerating Crestor 20 mg per day.  She had blood work done March 16, 2020 which demonstrated a calculated LDL of 76 mg/dL, cholesterol 136 mg/dL, HDL 42 mg/dL, and triglycerides of 91 mg/dL.  This was a marked improvement over her previous blood work from January 30, 2020 which demonstrated a total cholesterol of 288 mg/dL, calculated  mg/dL and a direct LDL of 197 mg/dL.  The patient has had a previous elevation of total cholesterol to 301 mg/dL with an LDL of 194 mg/dL in December 2018.\par The patient does not wish to be on a 20 mg dose of Crestor despite my recommendation of continuing her on the current dosage.  I have explained to her that she is a cholesterol goal for patient with heterozygous familial hypercholesterolemia.  She does not wish to stay on the 20 mg dose and will change to Crestor 10 mg per day and continue a prudent diet.  She will also work with the registered dietitian.\par PMH:\par The patient had blood work done January 13, 2020 which demonstrated total cholesterol of 288 mg/dL, HDL 64 mg/dL, calculated  mg/dL, triglycerides under 26 mg/dL and a direct LDL cholesterol 197 mg/dL.  \par \par \par She has significantly elevated total cholesterol, with an LDL cholesterol 194 mg/dL, total cholesterol 301 mg/dL. \par She does have a family history of hypercholesterolemia involving siblings. She also reports that one of her 2 children and high cholesterol as well. \par I have explained to her that her children and siblings may have familial hypercholesterolemia should be screened as well.  They may require medical therapy to\par The patient's cardiac risk factors include history of hypercholesterolemia. She reports that both brothers also high cholesterol.\par \par Blood work done by her primary care physician July 13, 2018 demonstrated total cholesterol 273 mg/dL, LDL calculated cholesterol 193 mg/dL, HDL 55 mg/dL, triglyceride 126 mg/dL, hemoglobin A1c of 5.4.\par The finding of LDL cholesterol greater than 190 is consistent with familial heterozygous hypercholesterolemia. The patient reports that she has had LDL cholesterols in the past of 140 mg/dL. A measure of direct LDL cholesterol is more accurate as usually higher than the calculated number. Blood work was done today for direct LDL cholesterol.\par \par Patient is instructed to follow with you regarding her overall medical care.

## 2021-12-14 ENCOUNTER — TRANSCRIPTION ENCOUNTER (OUTPATIENT)
Age: 71
End: 2021-12-14

## 2021-12-23 ENCOUNTER — TRANSCRIPTION ENCOUNTER (OUTPATIENT)
Age: 71
End: 2021-12-23

## 2021-12-27 ENCOUNTER — TRANSCRIPTION ENCOUNTER (OUTPATIENT)
Age: 71
End: 2021-12-27

## 2022-01-10 ENCOUNTER — APPOINTMENT (OUTPATIENT)
Dept: GERIATRICS | Facility: CLINIC | Age: 72
End: 2022-01-10

## 2022-02-16 ENCOUNTER — NON-APPOINTMENT (OUTPATIENT)
Age: 72
End: 2022-02-16

## 2022-02-17 ENCOUNTER — APPOINTMENT (OUTPATIENT)
Dept: GERIATRICS | Facility: CLINIC | Age: 72
End: 2022-02-17
Payer: MEDICARE

## 2022-02-17 VITALS
SYSTOLIC BLOOD PRESSURE: 124 MMHG | WEIGHT: 144 LBS | BODY MASS INDEX: 25.51 KG/M2 | TEMPERATURE: 97.7 F | OXYGEN SATURATION: 98 % | RESPIRATION RATE: 15 BRPM | DIASTOLIC BLOOD PRESSURE: 72 MMHG | HEART RATE: 83 BPM

## 2022-02-17 DIAGNOSIS — N95.2 POSTMENOPAUSAL ATROPHIC VAGINITIS: ICD-10-CM

## 2022-02-17 DIAGNOSIS — Z12.39 ENCOUNTER FOR OTHER SCREENING FOR MALIGNANT NEOPLASM OF BREAST: ICD-10-CM

## 2022-02-17 PROCEDURE — G0439: CPT

## 2022-02-20 PROBLEM — N95.2 VAGINAL ATROPHY: Status: ACTIVE | Noted: 2022-02-17

## 2022-02-20 NOTE — REVIEW OF SYSTEMS
[Fever] : no fever [Eyesight Problems] : eyesight problems [Loss Of Hearing] : hearing loss [Constipation] : constipation [Diarrhea] : no diarrhea [As Noted in HPI] : as noted in HPI [Sleep Disturbances] : sleep disturbances [Negative] : Heme/Lymph

## 2022-02-20 NOTE — PHYSICAL EXAM
[Normal Appearance] : normal in appearance [Breast Palpation Mass] : no palpable masses [General Appearance - Alert] : alert [General Appearance - In No Acute Distress] : in no acute distress [General Appearance - Well Nourished] : well nourished [General Appearance - Well-Appearing] : healthy appearing [Sclera] : the sclera and conjunctiva were normal [Extraocular Movements] : extraocular movements were intact [Outer Ear] : the ears and nose were normal in appearance [Neck Appearance] : the appearance of the neck was normal [Neck Cervical Mass (___cm)] : no neck mass was observed [Jugular Venous Distention Increased] : there was no jugular-venous distention [Thyroid Diffuse Enlargement] : the thyroid was not enlarged [Thyroid Nodule] : there were no palpable thyroid nodules [Respiration, Rhythm And Depth] : normal respiratory rhythm and effort [Exaggerated Use Of Accessory Muscles For Inspiration] : no accessory muscle use [Auscultation Breath Sounds / Voice Sounds] : lungs were clear to auscultation bilaterally [Heart Rate And Rhythm] : heart rate was normal and rhythm regular [Heart Sounds] : normal S1 and S2 [Edema] : there was no peripheral edema [Bowel Sounds] : normal bowel sounds [Abdomen Soft] : soft [Abdomen Tenderness] : non-tender [Abdomen Mass (___ Cm)] : no abdominal mass palpated [Cervical Lymph Nodes Enlarged Posterior Bilaterally] : posterior cervical [Cervical Lymph Nodes Enlarged Anterior Bilaterally] : anterior cervical [Supraclavicular Lymph Nodes Enlarged Bilaterally] : supraclavicular [Axillary Lymph Nodes Enlarged Bilaterally] : axillary [No Spinal Tenderness] : no spinal tenderness [Abnormal Walk] : normal gait [Nail Clubbing] : no clubbing  or cyanosis of the fingernails [Involuntary Movements] : no involuntary movements were seen [Musculoskeletal - Swelling] : no joint swelling seen [Skin Color & Pigmentation] : normal skin color and pigmentation [] : no rash [No Focal Deficits] : no focal deficits [Impaired Insight] : insight and judgment were intact [Affect] : the affect was normal [Mood] : the mood was normal

## 2022-02-20 NOTE — ASSESSMENT
[FreeTextEntry1] : \par hx colon cancer: in remission \par upto date with coloncoscopy 2022\par with Dr. Conroy\par follows with Dr. Gallo\par \par \par annual breast exam completed today\par -script provided for mammo\par \par

## 2022-02-20 NOTE — HISTORY OF PRESENT ILLNESS
[PMH Reviewed and Updated] : past medical history reviewed and updated [PSH Reviewed and Updated] : past surgical history reviewed and updated [Family History Reviewed and Updated] : family history reviewed and updated [Medication and Allergies Reconciled] : medication and allergies reconciled [No falls in past year] : Patient reported no falls in the past year [FreeTextEntry1] : 73yo F with pmhx of HLD, congenital 1 kidney,  \par hx of colon cancer: s/p partial resection 1999, s/p chemotherapy.\par Dr. Conroy GI:  last colonscopy 2022 - \par Dr. 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[employer], retired.\par smoked 5 years, in 20's, \par no etoh use\par \par some vaginal dryness without bleeding or discharge or dysuria\par \par HLD:\par started on zetia by cardiology Oct 2020\par 10/2020 echo: with normal lvef \par 10/2020 carotid dopplers negative\par \par \par for sleep disturbance: melatonin not effective\par chronic\par denies caffine prior to bed\par has tried meditation measures as well without complete relief\par has been taking alprazolam -discussed r/b/a and increased fall risk\par \par macular disease worse in R, but also in left\par hx of retinal tear in left \par \par family hx: brothers with HLD, mother: no stroke, dm2, or heart disease.\par \par complains of forgetting words, but later she is able to remember\par independent in handling iadl's.\par

## 2022-03-11 ENCOUNTER — TRANSCRIPTION ENCOUNTER (OUTPATIENT)
Age: 72
End: 2022-03-11

## 2022-04-05 ENCOUNTER — APPOINTMENT (OUTPATIENT)
Dept: MAMMOGRAPHY | Facility: CLINIC | Age: 72
End: 2022-04-05
Payer: MEDICARE

## 2022-04-05 ENCOUNTER — RESULT REVIEW (OUTPATIENT)
Age: 72
End: 2022-04-05

## 2022-04-05 PROCEDURE — 77063 BREAST TOMOSYNTHESIS BI: CPT

## 2022-04-05 PROCEDURE — 77067 SCR MAMMO BI INCL CAD: CPT

## 2022-04-14 ENCOUNTER — TRANSCRIPTION ENCOUNTER (OUTPATIENT)
Age: 72
End: 2022-04-14

## 2022-04-16 ENCOUNTER — APPOINTMENT (OUTPATIENT)
Dept: RHEUMATOLOGY | Facility: CLINIC | Age: 72
End: 2022-04-16
Payer: MEDICARE

## 2022-04-16 VITALS
HEART RATE: 79 BPM | WEIGHT: 140 LBS | TEMPERATURE: 97.7 F | OXYGEN SATURATION: 95 % | SYSTOLIC BLOOD PRESSURE: 123 MMHG | HEIGHT: 63 IN | BODY MASS INDEX: 24.8 KG/M2 | DIASTOLIC BLOOD PRESSURE: 79 MMHG

## 2022-04-16 DIAGNOSIS — M19.90 UNSPECIFIED OSTEOARTHRITIS, UNSPECIFIED SITE: ICD-10-CM

## 2022-04-16 PROCEDURE — 99203 OFFICE O/P NEW LOW 30 MIN: CPT

## 2022-04-21 PROBLEM — M19.90 OSTEOARTHRITIS: Status: ACTIVE | Noted: 2022-04-21

## 2022-04-26 ENCOUNTER — TRANSCRIPTION ENCOUNTER (OUTPATIENT)
Age: 72
End: 2022-04-26

## 2022-04-27 ENCOUNTER — APPOINTMENT (OUTPATIENT)
Dept: CARDIOLOGY | Facility: CLINIC | Age: 72
End: 2022-04-27
Payer: MEDICARE

## 2022-04-27 DIAGNOSIS — I65.29 OCCLUSION AND STENOSIS OF UNSPECIFIED CAROTID ARTERY: ICD-10-CM

## 2022-04-27 DIAGNOSIS — R09.89 OTHER SPECIFIED SYMPTOMS AND SIGNS INVOLVING THE CIRCULATORY AND RESPIRATORY SYSTEMS: ICD-10-CM

## 2022-04-27 PROCEDURE — 93306 TTE W/DOPPLER COMPLETE: CPT

## 2022-04-27 PROCEDURE — 93880 EXTRACRANIAL BILAT STUDY: CPT

## 2022-05-22 PROBLEM — R09.89 CAROTID ARTERY BRUIT: Status: ACTIVE | Noted: 2020-07-13

## 2022-05-22 PROBLEM — I65.29 CAROTID ARTERY PLAQUE: Status: ACTIVE | Noted: 2020-07-13

## 2022-06-08 ENCOUNTER — LABORATORY RESULT (OUTPATIENT)
Age: 72
End: 2022-06-08

## 2022-06-08 ENCOUNTER — NON-APPOINTMENT (OUTPATIENT)
Age: 72
End: 2022-06-08

## 2022-06-08 ENCOUNTER — APPOINTMENT (OUTPATIENT)
Dept: CARDIOLOGY | Facility: CLINIC | Age: 72
End: 2022-06-08
Payer: MEDICARE

## 2022-06-08 VITALS
TEMPERATURE: 97.8 F | SYSTOLIC BLOOD PRESSURE: 125 MMHG | OXYGEN SATURATION: 98 % | WEIGHT: 140 LBS | HEIGHT: 63 IN | RESPIRATION RATE: 16 BRPM | DIASTOLIC BLOOD PRESSURE: 76 MMHG | HEART RATE: 78 BPM | BODY MASS INDEX: 24.8 KG/M2

## 2022-06-08 DIAGNOSIS — R55 SYNCOPE AND COLLAPSE: ICD-10-CM

## 2022-06-08 PROCEDURE — 93000 ELECTROCARDIOGRAM COMPLETE: CPT

## 2022-06-08 PROCEDURE — 99214 OFFICE O/P EST MOD 30 MIN: CPT

## 2022-06-08 NOTE — DISCUSSION/SUMMARY
[FreeTextEntry1] : This is a 72-year-old female past medical history significant for minor coronary artery calcification, probable heterozygous familial hypercholesterolemia, history of colon cancer, murmurs, comes in for cardiac follow up evaluation. She denies chest pain, shortness of breath, dizziness or syncope.\par Cardiac risk factors include hypercholesterolemia, ( both brothers also high cholesterol), and prediabetes.\par The patient had a transient loss of consciousness in May 2022.  She felt that she was hypoglycemic and dehydrated.  She felt it coming on, and a transient loss of consciousness.\par Electrocardiogram done June 8, 2022 demonstrated normal sinus rhythm rate 78 bpm and is otherwise remarkable for left atrial abnormality.\par Echo Doppler examination done April 27, 2022 demonstrated mildly calcified mitral valve annulus with minimal mitral valve regurgitation, thickened aortic valve leaflets trace aortic valve regurgitation, mild tricuspid valve regurgitation, minimal pulmonic valve regurgitation with estimated ejection fraction of 64%.\par Coronary artery calcium score done May 14, 2019 demonstrated 3 units located in the left anterior descending artery.\par The patient is currently hemodynamically stable and asymptomatic from cardiac standpoint.  She does not wish to pursue any cardiac evaluation for her recent syncopal episode which sounds vasovagal in nature.\par She understands she must maintain good hydration.\par Electrocardiogram done November 9, 2021 demonstrate normal sinus rhythm rate of 82 bpm is otherwise unremarkable.\par Lipid panel done November 1, 2021 demonstrated cholesterol 156, HDL 63, LDL calculated 73 mg/dL, and triglycerides 97 mg/dL, non-HDL cholesterol 92 mg/dL, and direct LDL cholesterol of 72 mg/dL.  Hemoglobin A1c was 6.0 consistent with prediabetes.\par The patient had a normal exercise stress test 12/5/2018. \par Electrocardiogram done April 27, 2021 demonstrates normal sinus rhythm rate 74 bpm is otherwise unremarkable.\par Blood work done April 19, 2021, demonstrated cholesterol 157, HDL 69, triglycerides of 56, and LDL direct of 77 mg/dL which is at target level for FH, with an LDL target of less than 100 mg/dL.  She will follow-up with me after the above-noted statin vacation is completed\par Lifestyle modification including regular aerobic activity and dietary changes was reinforced.\par \par PMH:\par The patient is tolerating Crestor 20 mg per day.  She had blood work done March 16, 2020 which demonstrated a calculated LDL of 76 mg/dL, cholesterol 136 mg/dL, HDL 42 mg/dL, and triglycerides of 91 mg/dL.  This was a marked improvement over her previous blood work from January 30, 2020 which demonstrated a total cholesterol of 288 mg/dL, calculated  mg/dL and a direct LDL of 197 mg/dL.  The patient has had a previous elevation of total cholesterol to 301 mg/dL with an LDL of 194 mg/dL in December 2018.\par The patient does not wish to be on a 20 mg dose of Crestor despite my recommendation of continuing her on the current dosage.  I have explained to her that she is a cholesterol goal for patient with heterozygous familial hypercholesterolemia.  She does not wish to stay on the 20 mg dose and will change to Crestor 10 mg per day and continue a prudent diet.  She will also work with the registered dietitian.\par PMH:\par The patient had blood work done January 13, 2020 which demonstrated total cholesterol of 288 mg/dL, HDL 64 mg/dL, calculated  mg/dL, triglycerides under 26 mg/dL and a direct LDL cholesterol 197 mg/dL.  \par \par \par She has significantly elevated total cholesterol, with an LDL cholesterol 194 mg/dL, total cholesterol 301 mg/dL. \par She does have a family history of hypercholesterolemia involving siblings. She also reports that one of her 2 children and high cholesterol as well. \par I have explained to her that her children and siblings may have familial hypercholesterolemia should be screened as well.  They may require medical therapy to\par The patient's cardiac risk factors include history of hypercholesterolemia. She reports that both brothers also high cholesterol.\par \par Blood work done by her primary care physician July 13, 2018 demonstrated total cholesterol 273 mg/dL, LDL calculated cholesterol 193 mg/dL, HDL 55 mg/dL, triglyceride 126 mg/dL, hemoglobin A1c of 5.4.\par The finding of LDL cholesterol greater than 190 is consistent with familial heterozygous hypercholesterolemia. The patient reports that she has had LDL cholesterols in the past of 140 mg/dL. A measure of direct LDL cholesterol is more accurate as usually higher than the calculated number. Blood work was done today for direct LDL cholesterol.\par \par The patient understands that aerobic exercises must be increased to 40 minutes 4 times per week. A detailed discussion of lifestyle modification was done today. The patient has a good understanding of the diagnosis, and treatment plan. Lifestyle modification was also outlined.

## 2022-06-08 NOTE — PHYSICAL EXAM
[Well Developed] : well developed [Well Nourished] : well nourished [No Acute Distress] : no acute distress [Normal Venous Pressure] : normal venous pressure [No Carotid Bruit] : no carotid bruit [Normal S1, S2] : normal S1, S2 [No Rub] : no rub [Rhythm Regular] : regular [No Gallop] : no gallop heard [I] : a grade 1 [Clear Lung Fields] : clear lung fields [Good Air Entry] : good air entry [No Respiratory Distress] : no respiratory distress  [Soft] : abdomen soft [Non Tender] : non-tender [No Masses/organomegaly] : no masses/organomegaly [Normal Bowel Sounds] : normal bowel sounds [Normal Gait] : normal gait [No Edema] : no edema [No Cyanosis] : no cyanosis [No Clubbing] : no clubbing [No Varicosities] : no varicosities [No Rash] : no rash [No Skin Lesions] : no skin lesions [Moves all extremities] : moves all extremities [No Focal Deficits] : no focal deficits [Normal Speech] : normal speech [Alert and Oriented] : alert and oriented [Normal memory] : normal memory [General Appearance - Well Developed] : well developed [Normal Appearance] : normal appearance [Well Groomed] : well groomed [General Appearance - Well Nourished] : well nourished [No Deformities] : no deformities [General Appearance - In No Acute Distress] : no acute distress [Normal Conjunctiva] : the conjunctiva exhibited no abnormalities [Normal Oral Mucosa] : normal oral mucosa [No Oral Pallor] : no oral pallor [Normal Oropharynx] : normal oropharynx [Normal Jugular Venous A Waves Present] : normal jugular venous A waves present [Normal Jugular Venous V Waves Present] : normal jugular venous V waves present [No Jugular Venous Browne A Waves] : no jugular venous browne A waves [Exaggerated Use Of Accessory Muscles For Inspiration] : no accessory muscle use [5th Left ICS - MCL] : palpated at the 5th LICS in the midclavicular line [Normal] : normal [No Precordial Heave] : no precordial heave was noted [Normal Rate] : normal [Normal S1] : normal S1 [Normal S2] : normal S2 [No Murmur] : no murmurs heard [2+] : left 2+ [No Abnormalities] : the abdominal aorta was not enlarged and no bruit was heard [No Pitting Edema] : no pitting edema present [Bowel Sounds] : normal bowel sounds [Abdomen Soft] : soft [Abnormal Walk] : normal gait [Gait - Sufficient For Exercise Testing] : the gait was sufficient for exercise testing [Nail Clubbing] : no clubbing of the fingernails [Cyanosis, Localized] : no localized cyanosis [Skin Color & Pigmentation] : normal skin color and pigmentation [Skin Turgor] : normal skin turgor [] : no rash [Oriented To Time, Place, And Person] : oriented to person, place, and time [Affect] : the affect was normal [Mood] : the mood was normal [Apical Thrill] : no thrill palpable at the apex [Click] : no click [Pericardial Rub] : no pericardial rub [S3] : no S3 [S4] : no S4 [Right Carotid Bruit] : no bruit heard over the right carotid [Left Carotid Bruit] : no bruit heard over the left carotid [Right Femoral Bruit] : no bruit heard over the right femoral artery [Left Femoral Bruit] : no bruit heard over the left femoral artery

## 2022-06-13 ENCOUNTER — TRANSCRIPTION ENCOUNTER (OUTPATIENT)
Age: 72
End: 2022-06-13

## 2022-06-13 ENCOUNTER — NON-APPOINTMENT (OUTPATIENT)
Age: 72
End: 2022-06-13

## 2022-06-13 ENCOUNTER — APPOINTMENT (OUTPATIENT)
Dept: PULMONOLOGY | Facility: CLINIC | Age: 72
End: 2022-06-13
Payer: MEDICARE

## 2022-06-13 VITALS
RESPIRATION RATE: 16 BRPM | BODY MASS INDEX: 24.8 KG/M2 | HEART RATE: 99 BPM | SYSTOLIC BLOOD PRESSURE: 123 MMHG | HEIGHT: 63 IN | DIASTOLIC BLOOD PRESSURE: 60 MMHG | OXYGEN SATURATION: 98 % | WEIGHT: 140 LBS | TEMPERATURE: 97.5 F

## 2022-06-13 DIAGNOSIS — Z71.89 OTHER SPECIFIED COUNSELING: ICD-10-CM

## 2022-06-13 PROCEDURE — 95012 NITRIC OXIDE EXP GAS DETER: CPT

## 2022-06-13 PROCEDURE — 99214 OFFICE O/P EST MOD 30 MIN: CPT | Mod: 25

## 2022-06-13 PROCEDURE — 94010 BREATHING CAPACITY TEST: CPT

## 2022-06-13 NOTE — ADDENDUM
[FreeTextEntry1] : Documented by Jasvir Goodwin acting as a scribe for Dr. Dony Mar on 06/13/2022.\par \par All medical record entries made by the Scribe were at my, Dr. Dony Mar's, direction and personally dictated by me on 06/13/2022. I have reviewed the chart and agree that the record accurately reflects my personal performance of the history, physical exam, assessment and plan. I have also personally directed, reviewed, and agree with the discharge instructions.

## 2022-06-13 NOTE — PHYSICAL EXAM
[No Acute Distress] : no acute distress [Well Nourished] : well nourished [Well Groomed] : well groomed [No Deformities] : no deformities [Well Developed] : well developed [Normal Oropharynx] : normal oropharynx [Normal Appearance] : normal appearance [No Neck Mass] : no neck mass [Normal Rate/Rhythm] : normal rate/rhythm [Normal S1, S2] : normal s1, s2 [No Murmurs] : no murmurs [No Resp Distress] : no resp distress [Clear to Auscultation Bilaterally] : clear to auscultation bilaterally [No Abnormalities] : no abnormalities [Benign] : benign [Normal Gait] : normal gait [No Clubbing] : no clubbing [No Edema] : no edema [No Cyanosis] : no cyanosis [FROM] : FROM [Normal Color/ Pigmentation] : normal color/ pigmentation [No Focal Deficits] : no focal deficits [Oriented x3] : oriented x3 [Normal Affect] : normal affect [III] : Mallampati Class: III [TextBox_[id number]] : I:E 1:3, clear

## 2022-06-13 NOTE — HISTORY OF PRESENT ILLNESS
[FreeTextEntry1] : Ms. Calvillo is a 72 year old female with a history of asthma, allergies, WILLIS, elevated IgE, eosinophilic asthma, snoring, and SOB presenting to the office today for an follow-up visit. Her chief complaint is \par -she notes that she is back from [place] (came back in the middle of May)\par -she notes some SOB when hiking in [place]\par -she notes generally feeling okay\par -she notes feeling something in her throat\par -she notes some sweats at night\par -she notes that her sleep is variable in quality (average 6-7 hours of sleep)\par -she notes that he  interrupts her sleep\par -she notes her weight is stable (would like it to be less)\par -she denies hoarseness \par -no new medications, vitamins, or supplements \par -s/p covid 19 vaccine x4\par -she notes that her tennis elbow is present but is not as bad as previously\par -she notes Qnasl helped her \par -she notes that she has a rash near the bridge of her nose\par -she notes eating well\par \par \par -patient denies any headaches, nausea, vomiting, fever, chills, chest pain, chest pressure, palpitations, coughing, wheezing, fatigue, diarrhea, constipation, dysphagia, myalgias, dizziness, leg swelling, leg pain, itchy eyes, itchy ears, heartburn, reflux or sour taste in the mouth

## 2022-06-13 NOTE — PROCEDURE
[FreeTextEntry1] : Feno was 9; a normal value being less than 25. Fractional exhaled nitric oxide (FENO) is regarded as a simple, noninvasive method for assessing eosinophilic airway inflammation. Produced by a variety of cells within the lung, nitric oxide (NO) concentrations are generally low in healthy individuals. However, high concentrations of NO appear to be involved in nonspecific host defense mechanisms and chronic inflammatory  diseases such as asthma. The American Thoracic Society (ATS) therefore recommended using FENO to aid in the diagnosis and monitoring of eosinophilic airway inflammation and asthma, and for identifying steroid responsive individuals whose chronic respiratory symptoms may be caused by airway inflammation \par \par PFT revealed normal flows, with a FEV1 of 1.88L,which is 90% of predicted with a normal flow volume loop

## 2022-06-13 NOTE — ASSESSMENT
[FreeTextEntry1] : Ms. LUJAN is a 72 year old female with a history of colon cancer (stage 3) s/p chemotherapy and resection, HLD, heart murmur, osteopenia, and poor sleep who now comes to the office for a follow-up pulmonary evaluation  (stable). Her number one issue is allergies - improved\par \par Her shortness of breath is multifactorial due to:\par -poor mechanics of breathing \par -out of shape / overweight\par -pulmonary disease\par -cardiac disease \par \par problem 1: moderate  asthma- stable\par -add Ventolin 2 puffs Q6H, pre-exercise \par -Asthma is believed to be caused by inherited (genetic) and environmental factor, but its exact cause is unknown. Asthma may be triggered by allergens, lung infections, or irritants in the air. Asthma triggers are different for each person \par -Inhaler technique reviewed as well as oral hygiene techniques reviewed with patient. Avoidance of cold air, extremes of temperature, rescue inhaler should be used before exercise. Order of medication reviewed with patient. Recommended use of a cool mist humidifier in the bedroom \par \par Problem 1A: Elevated IgE \par - candidate for dupixent\par Dupixent is a prescription medicine used with other asthma medicines for the maintenance treatment of moderate-to-severe asthma in people aged 12 years and older whose asthma is not controlled with their current asthma medicines. Dupixent helps prevent severe asthma attacks (exacerbations) and can improve your breathing. Dupixent may also help reduce the amount of oral corticosteroids you need while preventing severe asthma attacks and improving your breathing. Dupixent is not used to treat sudden breathing problems. Risks and side effect of Dupixent were discussed and reviewed with patient. \par \par Problem 1B: Eosinophilic asthma\par - candidate for nucala / fasenra\par -The safety and efficacy of Nucala was established in three double-blind, randomized, placebo-controlled trials in patients with severe asthma. Compared to a placebo, patients with severe asthma receiving Nucala had fewer exacerbation requiring hospitalization and/or emergency department visits, and a longer time to first exacerbation.  In addition, patients with severe asthma receiving Nucala or Fasenra experienced greater reductions in their daily maintenance oral corticosteroid dose, while maintaining asthma control compared with patients receiving placebo. Treatment with Nucala did not result in a significant improvement in lung function, as measured by the volume of air exhaled by patients in one second. The most common side effects include: headache, injection site reactions, back pain, weakness, and fatigue; hypersensitivity reactions can occur within hours or days including swelling of the face, mouth, and tongue, fainting, dizziness, hives, breathing problems, and rash; herpes zoster infections have occurred. The drug is a monoclonal antibody that inhibits interleukin-5 which helps regular eosinophils, a type of white blood cell that contributes to asthma. The over-production of eosinophils can cause inflammation in the lungs, increasing the frequency of asthma attacks. Patients must also take other medications, including high dose inhaled corticosteroids and at least one additional asthma drug\par  \par \par problem 2: allergy sinus \par -s/p Blood work to include: asthma panel (+), food IgE panel, IgE level (+), eosinophil level (+), vitamin D level \par -continue Olopatadine 0.6% 1 sniff BID / Qnasl 1 sniff BID or Atrovent 0.6% at 1 sniff/nostril, up to TID \par -Add Clarinex 5 mg before bed \par -Environmental measures for allergies were encouraged including mattress and pillow cover, air purifier, and environmental controls \par \par problem 3: poor sleep - r/o ?OSAS\par -get Home sleep study, if HSS c/w LOVELY, consider Oral Appliance if needed\par Sleep apnea is associated with adverse clinical consequences which an affect most organ systems. Cardiovascular disease risk includes arrhythmias, atrial fibrillation, hypertension, coronary artery disease, and stroke. Metabolic disorders include diabetes type 2, non-alcoholic fatty liver disease. Mood disorder especially depression; and cognitive decline especially in the elderly. Associations with chronic reflux/Boswell’s esophagus some but not all inclusive. \par -Reasons include arousal consistent with hypopnea; respiratory events most prominent in REM sleep or supine position; therefore sleep staging and body position are important for accurate diagnosis and estimation of AHI.\par -Good sleep hygiene was encouraged including avoiding watching television an hour before bed, keeping caffeine at a low, avoiding reading, television, or anything, in bed, no drinking any liquids three hours before bedtime, and only getting into bed when tired and ready for sleep.\par \par problem 4: cardiac disease\par -recommended to continue to follow up with Cardiologist - Dr. Linwood Lambert\par \par problem 5: poor breathing mechanics\par -Recommended Wim Hof and Buteyko breathing techniques \par -Proper breathing techniques were reviewed with an emphasis of exhalation. Patient instructed to breath in for 1 second and out for four seconds. Patient was encouraged to not talk while walking. \par \par problem 6: out of shape / overweight\par -Weight loss, exercise, and diet control were discussed and are highly encouraged. Treatment options were given such as, aqua therapy, and contacting a nutritionist. Recommended to use the elliptical, stationary bike, less use of treadmill. Mindful eating was explained to the patient Obesity is associated with worsening asthma, shortness of breath, and potential for cardiac disease, diabetes, and other underlying medical conditions\par \par Problem 7a: COVID-19 precautionary Immune Support Recommendations:\par - S/p Covid 19 vaccine (Moderna) x4\par -OTC Vitamin C 500mg BID \par -OTC Quercetin 250-500mg BID \par -OTC Zinc 75-100mg per day \par -OTC Melatonin 1 or 2mg a night \par -OTC Vitamin D 1-4000mg per day \par -OTC Tonic Water 8oz per day\par -Water 0.5-1 gallon per day\par \par problem 7: health maintenance \par -s/p covid 19 vaccine x4\par -Recommended not to get an additional COVID-19 booster at this time until it is updated against the current variants. If planning on travelling, obtaining another booster within 2 weeks of departure is recommended. \par -s/p yearly flu shot - 10/2021\par -recommended strep pneumonia vaccines: Prevnar-13 vaccine, followed by Pneumo vaccine 23 one year following\par -recommended early intervention for Upper Respiratory Infections (URIs)\par -recommended regular osteoporosis evaluations\par -recommended early dermatological evaluations\par -recommended after the age of 50 to the age of 70, colonoscopy every 5 years\par \par F/U in 3-4 months \par She is encouraged to call with any changes, concerns, or questions

## 2022-07-14 ENCOUNTER — APPOINTMENT (OUTPATIENT)
Dept: RADIOLOGY | Facility: CLINIC | Age: 72
End: 2022-07-14

## 2022-07-14 PROCEDURE — 77080 DXA BONE DENSITY AXIAL: CPT

## 2022-08-02 ENCOUNTER — NON-APPOINTMENT (OUTPATIENT)
Age: 72
End: 2022-08-02

## 2022-08-02 ENCOUNTER — TRANSCRIPTION ENCOUNTER (OUTPATIENT)
Age: 72
End: 2022-08-02

## 2022-08-02 LAB
RAPID RVP RESULT: DETECTED
SARS-COV-2 RNA PNL RESP NAA+PROBE: DETECTED

## 2022-08-06 ENCOUNTER — TRANSCRIPTION ENCOUNTER (OUTPATIENT)
Age: 72
End: 2022-08-06

## 2022-08-06 ENCOUNTER — NON-APPOINTMENT (OUTPATIENT)
Age: 72
End: 2022-08-06

## 2022-08-08 ENCOUNTER — APPOINTMENT (OUTPATIENT)
Dept: DISASTER EMERGENCY | Facility: HOSPITAL | Age: 72
End: 2022-08-08

## 2022-08-08 ENCOUNTER — TRANSCRIPTION ENCOUNTER (OUTPATIENT)
Age: 72
End: 2022-08-08

## 2022-08-08 ENCOUNTER — OUTPATIENT (OUTPATIENT)
Dept: OUTPATIENT SERVICES | Facility: HOSPITAL | Age: 72
LOS: 1 days | End: 2022-08-08

## 2022-08-08 VITALS
TEMPERATURE: 98 F | OXYGEN SATURATION: 96 % | HEIGHT: 64 IN | WEIGHT: 138.01 LBS | RESPIRATION RATE: 16 BRPM | HEART RATE: 82 BPM | DIASTOLIC BLOOD PRESSURE: 86 MMHG | SYSTOLIC BLOOD PRESSURE: 137 MMHG

## 2022-08-08 VITALS
RESPIRATION RATE: 16 BRPM | OXYGEN SATURATION: 97 % | DIASTOLIC BLOOD PRESSURE: 79 MMHG | TEMPERATURE: 99 F | HEART RATE: 87 BPM | SYSTOLIC BLOOD PRESSURE: 134 MMHG

## 2022-08-08 DIAGNOSIS — Z98.890 OTHER SPECIFIED POSTPROCEDURAL STATES: Chronic | ICD-10-CM

## 2022-08-08 DIAGNOSIS — U07.1 COVID-19: ICD-10-CM

## 2022-08-08 DIAGNOSIS — Z98.41 CATARACT EXTRACTION STATUS, RIGHT EYE: Chronic | ICD-10-CM

## 2022-08-08 RX ORDER — BEBTELOVIMAB 87.5 MG/ML
175 INJECTION, SOLUTION INTRAVENOUS ONCE
Refills: 0 | Status: COMPLETED | OUTPATIENT
Start: 2022-08-08 | End: 2022-08-08

## 2022-08-08 RX ADMIN — BEBTELOVIMAB 175 MILLIGRAM(S): 87.5 INJECTION, SOLUTION INTRAVENOUS at 08:10

## 2022-08-08 NOTE — MONOCLONAL ANTIBODY INFUSION - EXAM
CC: Monoclonal Antibody Infusion/COVID 19 Positive  72yFemale    exam/findings:  T(C): 36.6 (08-08-22 @ 07:52), Max: 36.6 (08-08-22 @ 07:52)  HR: 82 (08-08-22 @ 07:52) (82 - 82)  BP: 137/86 (08-08-22 @ 07:52) (137/86 - 137/86)  RR: 16 (08-08-22 @ 07:52) (16 - 16)  SpO2: 96% (08-08-22 @ 07:52) (96% - 96%)      PE:   Appearance: NAD	  HEENT:   Normal oral mucosa,   Lymphatic: No lymphadenopathy  Cardiovascular: Normal S1 S2, No JVD, No murmurs, No edema  Respiratory: Lungs clear to auscultation	  Gastrointestinal:  Soft, Non-tender, + BS	  Skin: warm and dry  Neurologic: Non-focal  Extremities: Normal range of motion,

## 2022-08-09 ENCOUNTER — TRANSCRIPTION ENCOUNTER (OUTPATIENT)
Age: 72
End: 2022-08-09

## 2022-08-11 ENCOUNTER — TRANSCRIPTION ENCOUNTER (OUTPATIENT)
Age: 72
End: 2022-08-11

## 2022-10-03 ENCOUNTER — TRANSCRIPTION ENCOUNTER (OUTPATIENT)
Age: 72
End: 2022-10-03

## 2022-10-06 ENCOUNTER — TRANSCRIPTION ENCOUNTER (OUTPATIENT)
Age: 72
End: 2022-10-06

## 2022-11-07 ENCOUNTER — TRANSCRIPTION ENCOUNTER (OUTPATIENT)
Age: 72
End: 2022-11-07

## 2022-11-08 ENCOUNTER — TRANSCRIPTION ENCOUNTER (OUTPATIENT)
Age: 72
End: 2022-11-08

## 2022-11-14 ENCOUNTER — LABORATORY RESULT (OUTPATIENT)
Age: 72
End: 2022-11-14

## 2022-11-15 ENCOUNTER — APPOINTMENT (OUTPATIENT)
Dept: CARDIOLOGY | Facility: CLINIC | Age: 72
End: 2022-11-15

## 2022-11-15 VITALS
BODY MASS INDEX: 24.8 KG/M2 | WEIGHT: 140 LBS | HEIGHT: 63 IN | RESPIRATION RATE: 16 BRPM | HEART RATE: 88 BPM | DIASTOLIC BLOOD PRESSURE: 79 MMHG | TEMPERATURE: 98.3 F | SYSTOLIC BLOOD PRESSURE: 126 MMHG | OXYGEN SATURATION: 99 %

## 2022-11-15 PROCEDURE — 99213 OFFICE O/P EST LOW 20 MIN: CPT | Mod: 25

## 2022-11-15 PROCEDURE — 93015 CV STRESS TEST SUPVJ I&R: CPT

## 2022-11-15 RX ORDER — ALPRAZOLAM 0.5 MG/1
0.5 TABLET ORAL
Qty: 15 | Refills: 0 | Status: COMPLETED | COMMUNITY
Start: 2022-02-17 | End: 2022-11-15

## 2022-11-15 RX ORDER — ESTRADIOL 0.1 MG/G
0.1 CREAM VAGINAL
Qty: 1 | Refills: 3 | Status: COMPLETED | COMMUNITY
Start: 2022-02-17 | End: 2022-11-15

## 2022-11-15 RX ORDER — OLOPATADINE HYDROCHLORIDE 665 UG/1
0.6 SPRAY, METERED NASAL
Qty: 3 | Refills: 1 | Status: COMPLETED | COMMUNITY
Start: 2019-11-21 | End: 2022-11-15

## 2022-11-15 NOTE — DISCUSSION/SUMMARY
[FreeTextEntry1] : This is a 72-year-old female past medical history significant for minor coronary artery calcification, probable heterozygous familial hypercholesterolemia, history of colon cancer, murmurs, comes in for cardiac follow up evaluation. She denies chest pain, shortness of breath, dizziness or syncope.\par Cardiac risk factors include hypercholesterolemia, ( both brothers also high cholesterol), and prediabetes).\par The patient had a coronary artery calcium score of 3 units, probably representing a normal finding.\par The patient had a normal exercise stress test November 15, 2022.\par She had a normal nuclear stress test May 23, 2019.\par Echo Doppler examination done April 27, 2022 demonstrated calcified mitral annulus and minimal mitral valve regurgitation, minimal pulmonic valve regurgitation, mild tricuspid valve regurgitation, trace aortic valve regurgitation, thickened aortic valve leaflets.  The estimated ejection fraction was 64%.\par Lipid panel done November 14, 2022 demonstrated cholesterol of 166, HDL of 73, LDL of 80, non-HDL 93 mg/dL and triglycerides of 64 mg/dL.\par The patient will continue on her current dose of pravastatin 40 mg daily and Zetia 10 mg/day.  The patient is at target LDL cholesterol.\par PMH:\par The patient had a transient loss of consciousness in May 2022.  She felt that she was hypoglycemic and dehydrated.  She felt it coming on, and a transient loss of consciousness.\par Electrocardiogram done June 8, 2022 demonstrated normal sinus rhythm rate 78 bpm and is otherwise remarkable for left atrial abnormality.\par .\par Coronary artery calcium score done May 14, 2019 demonstrated 3 units located in the left anterior descending artery.\par She understands she must maintain good hydration.\par Electrocardiogram done November 9, 2021 demonstrate normal sinus rhythm rate of 82 bpm is otherwise unremarkable.\par Lipid panel done November 1, 2021 demonstrated cholesterol 156, HDL 63, LDL calculated 73 mg/dL, and triglycerides 97 mg/dL, non-HDL cholesterol 92 mg/dL, and direct LDL cholesterol of 72 mg/dL.  Hemoglobin A1c was 6.0 consistent with prediabetes.\par The patient had a normal exercise stress test 12/5/2018. \par Electrocardiogram done April 27, 2021 demonstrates normal sinus rhythm rate 74 bpm is otherwise unremarkable.\par Blood work done April 19, 2021, demonstrated cholesterol 157, HDL 69, triglycerides of 56, and LDL direct of 77 mg/dL which is at target level for FH, with an LDL target of less than 100 mg/dL.  She will follow-up with me after the above-noted statin vacation is completed\par Lifestyle modification including regular aerobic activity and dietary changes was reinforced.\par \par PMH:\par The patient is tolerating Crestor 20 mg per day.  She had blood work done March 16, 2020 which demonstrated a calculated LDL of 76 mg/dL, cholesterol 136 mg/dL, HDL 42 mg/dL, and triglycerides of 91 mg/dL.  This was a marked improvement over her previous blood work from January 30, 2020 which demonstrated a total cholesterol of 288 mg/dL, calculated  mg/dL and a direct LDL of 197 mg/dL.  The patient has had a previous elevation of total cholesterol to 301 mg/dL with an LDL of 194 mg/dL in December 2018.\par The patient does not wish to be on a 20 mg dose of Crestor despite my recommendation of continuing her on the current dosage.  I have explained to her that she is a cholesterol goal for patient with heterozygous familial hypercholesterolemia.  She does not wish to stay on the 20 mg dose and will change to Crestor 10 mg per day and continue a prudent diet.  She will also work with the registered dietitian.\par PMH:\par The patient had blood work done January 13, 2020 which demonstrated total cholesterol of 288 mg/dL, HDL 64 mg/dL, calculated  mg/dL, triglycerides under 26 mg/dL and a direct LDL cholesterol 197 mg/dL.  \par \par \par She has significantly elevated total cholesterol, with an LDL cholesterol 194 mg/dL, total cholesterol 301 mg/dL. \par She does have a family history of hypercholesterolemia involving siblings. She also reports that one of her 2 children and high cholesterol as well. \par I have explained to her that her children and siblings may have familial hypercholesterolemia should be screened as well.  They may require medical therapy to\par The patient's cardiac risk factors include history of hypercholesterolemia. She reports that both brothers also high cholesterol.\par \par Blood work done by her primary care physician July 13, 2018 demonstrated total cholesterol 273 mg/dL, LDL calculated cholesterol 193 mg/dL, HDL 55 mg/dL, triglyceride 126 mg/dL, hemoglobin A1c of 5.4.\par The finding of LDL cholesterol greater than 190 is consistent with familial heterozygous hypercholesterolemia. The patient reports that she has had LDL cholesterols in the past of 140 mg/dL. A measure of direct LDL cholesterol is more accurate as usually higher than the calculated number. Blood work was done today for direct LDL cholesterol.\par \par The patient understands that aerobic exercises must be increased to 40 minutes 4 times per week. A detailed discussion of lifestyle modification was done today. The patient has a good understanding of the diagnosis, and treatment plan. Lifestyle modification was also outlined.

## 2022-11-23 RX ORDER — BECLOMETHASONE DIPROPIONATE 80 UG/1
80 AEROSOL, METERED NASAL
Qty: 3 | Refills: 1 | Status: ACTIVE | COMMUNITY
Start: 2021-03-02 | End: 1900-01-01

## 2022-11-25 ENCOUNTER — APPOINTMENT (OUTPATIENT)
Dept: GERIATRICS | Facility: CLINIC | Age: 72
End: 2022-11-25

## 2022-11-25 VITALS
TEMPERATURE: 97.2 F | DIASTOLIC BLOOD PRESSURE: 72 MMHG | HEART RATE: 90 BPM | WEIGHT: 141.5 LBS | HEIGHT: 63 IN | OXYGEN SATURATION: 99 % | RESPIRATION RATE: 16 BRPM | SYSTOLIC BLOOD PRESSURE: 110 MMHG | BODY MASS INDEX: 25.07 KG/M2

## 2022-11-25 DIAGNOSIS — Q60.0 RENAL AGENESIS, UNILATERAL: ICD-10-CM

## 2022-11-25 DIAGNOSIS — G47.9 SLEEP DISORDER, UNSPECIFIED: ICD-10-CM

## 2022-11-25 DIAGNOSIS — M85.80 OTHER SPECIFIED DISORDERS OF BONE DENSITY AND STRUCTURE, UNSPECIFIED SITE: ICD-10-CM

## 2022-11-25 PROCEDURE — 99214 OFFICE O/P EST MOD 30 MIN: CPT

## 2022-11-25 RX ORDER — COVID-19 MOLECULAR TEST ASSAY
KIT MISCELLANEOUS
Qty: 8 | Refills: 0 | Status: DISCONTINUED | COMMUNITY
Start: 2022-07-14

## 2022-11-25 NOTE — HISTORY OF PRESENT ILLNESS
[FreeTextEntry1] : 73yo F with pmhx of HLD, congenital 1 kidney, seen for follow up visit.\par hx of colon cancer: s/p partial resection 1999, s/p chemotherapy.\par Dr. Conroy GI: last colonscopy 2022 - \par Dr. Venita Gallo: oncologist: \par no wt loss, bleeding, ab pain, or changes in stool.\par \par osteopenia: dexa 2022: -1.2, \par walks 5 miles daily\par no strength training \par \par mammogram uptodate due to april 2023\par \par hld: adherent with statin and zetia\par \par normal stress test 2022\par \par sleep distrubance: \par has used alprazolam in the past rarely\par advised instead to use melatonin

## 2022-11-25 NOTE — ASSESSMENT
[FreeTextEntry1] : hx colon cancer: in remission \par upto date with coloncoscopy 2022\par with Dr. Conroy\par follows with Dr. Gallo\par

## 2022-11-25 NOTE — PHYSICAL EXAM
[Alert] : alert [No Acute Distress] : in no acute distress [Sclera] : the sclera and conjunctiva were normal [EOMI] : extraocular movements were intact [Normal Outer Ear/Nose] : the ears and nose were normal in appearance [Normal Appearance] : the appearance of the neck was normal [No Respiratory Distress] : no respiratory distress [No Acc Muscle Use] : no accessory muscle use [Respiration, Rhythm And Depth] : normal respiratory rhythm and effort [Auscultation Breath Sounds / Voice Sounds] : lungs were clear to auscultation bilaterally [Normal S1, S2] : normal S1 and S2 [Heart Rate And Rhythm] : heart rate was normal and rhythm regular [Edema] : edema was not present [Abdomen Tenderness] : non-tender [Abdomen Soft] : soft [No Clubbing, Cyanosis] : no clubbing or cyanosis of the fingernails [Involuntary Movements] : no involuntary movements were seen [Normal Color / Pigmentation] : normal skin color and pigmentation [No Focal Deficits] : no focal deficits [Normal Affect] : the affect was normal [Normal Mood] : the mood was normal

## 2022-12-23 ENCOUNTER — APPOINTMENT (OUTPATIENT)
Dept: GERIATRICS | Facility: CLINIC | Age: 72
End: 2022-12-23

## 2022-12-23 PROCEDURE — 99214 OFFICE O/P EST MOD 30 MIN: CPT | Mod: 95

## 2022-12-23 NOTE — REASON FOR VISIT
[Acute] : an acute visit [Home] : at home, [unfilled] , at the time of the visit. [Medical Office: ([facility])___] : at the medical office located in  [Spouse] : spouse

## 2022-12-23 NOTE — PHYSICAL EXAM
[Alert] : alert [No Respiratory Distress] : no respiratory distress [Oriented To Time, Place, And Person] : oriented to person, place, and time [Normal Insight/Judgment] : insight and judgment were intact [de-identified] : congested [FreeTextEntry1] : wears glasses

## 2022-12-23 NOTE — HISTORY OF PRESENT ILLNESS
[No falls in past year] : Patient reported no falls in the past year [Completely Independent] : Completely independent. [Patient/Caregiver not ready to engage] : , patient/caregiver not ready to engage [FreeTextEntry1] : Patient is a 73 y/o female who comes for acute visit for URTI. \par \par Patients  was sick few days ago and states that would be her sick contact.\par Both tested negative for the flu. \par \par Patient had a fever yesterday 101. Today temp normal. \par + HA, Nasal congestion, Fatigue\par - cough, no phlegm, no chest pain. \par \par  [[facility]] : n/a

## 2022-12-23 NOTE — ASSESSMENT
[FreeTextEntry1] : will f/u in 3-4 days\par advised there is an on karen dr that can help guide patient should symptoms get worse\par currently patient is stable

## 2023-04-28 ENCOUNTER — TRANSCRIPTION ENCOUNTER (OUTPATIENT)
Age: 73
End: 2023-04-28

## 2023-05-01 ENCOUNTER — TRANSCRIPTION ENCOUNTER (OUTPATIENT)
Age: 73
End: 2023-05-01

## 2023-05-09 ENCOUNTER — LABORATORY RESULT (OUTPATIENT)
Age: 73
End: 2023-05-09

## 2023-05-10 ENCOUNTER — RESULT REVIEW (OUTPATIENT)
Age: 73
End: 2023-05-10

## 2023-05-10 ENCOUNTER — APPOINTMENT (OUTPATIENT)
Dept: MAMMOGRAPHY | Facility: CLINIC | Age: 73
End: 2023-05-10
Payer: MEDICARE

## 2023-05-10 PROCEDURE — 77067 SCR MAMMO BI INCL CAD: CPT

## 2023-05-10 PROCEDURE — 77063 BREAST TOMOSYNTHESIS BI: CPT

## 2023-05-22 ENCOUNTER — APPOINTMENT (OUTPATIENT)
Dept: MAMMOGRAPHY | Facility: IMAGING CENTER | Age: 73
End: 2023-05-22
Payer: MEDICARE

## 2023-05-22 ENCOUNTER — RESULT REVIEW (OUTPATIENT)
Age: 73
End: 2023-05-22

## 2023-05-22 ENCOUNTER — OUTPATIENT (OUTPATIENT)
Dept: OUTPATIENT SERVICES | Facility: HOSPITAL | Age: 73
LOS: 1 days | End: 2023-05-22
Payer: MEDICARE

## 2023-05-22 ENCOUNTER — APPOINTMENT (OUTPATIENT)
Dept: ULTRASOUND IMAGING | Facility: IMAGING CENTER | Age: 73
End: 2023-05-22
Payer: MEDICARE

## 2023-05-22 DIAGNOSIS — Z98.890 OTHER SPECIFIED POSTPROCEDURAL STATES: Chronic | ICD-10-CM

## 2023-05-22 DIAGNOSIS — Z00.8 ENCOUNTER FOR OTHER GENERAL EXAMINATION: ICD-10-CM

## 2023-05-22 DIAGNOSIS — Z98.41 CATARACT EXTRACTION STATUS, RIGHT EYE: Chronic | ICD-10-CM

## 2023-05-22 PROCEDURE — G0279: CPT

## 2023-05-22 PROCEDURE — 76642 ULTRASOUND BREAST LIMITED: CPT

## 2023-05-22 PROCEDURE — 76642 ULTRASOUND BREAST LIMITED: CPT | Mod: 26,RT

## 2023-05-22 PROCEDURE — 77065 DX MAMMO INCL CAD UNI: CPT | Mod: 26,RT

## 2023-05-22 PROCEDURE — G0279: CPT | Mod: 26

## 2023-05-22 PROCEDURE — 77065 DX MAMMO INCL CAD UNI: CPT

## 2023-05-30 ENCOUNTER — NON-APPOINTMENT (OUTPATIENT)
Age: 73
End: 2023-05-30

## 2023-05-30 ENCOUNTER — APPOINTMENT (OUTPATIENT)
Dept: CARDIOLOGY | Facility: CLINIC | Age: 73
End: 2023-05-30
Payer: MEDICARE

## 2023-05-30 VITALS
BODY MASS INDEX: 24.8 KG/M2 | RESPIRATION RATE: 16 BRPM | HEIGHT: 63 IN | WEIGHT: 140 LBS | OXYGEN SATURATION: 95 % | HEART RATE: 74 BPM | TEMPERATURE: 97.9 F | DIASTOLIC BLOOD PRESSURE: 78 MMHG | SYSTOLIC BLOOD PRESSURE: 121 MMHG

## 2023-05-30 PROCEDURE — 99214 OFFICE O/P EST MOD 30 MIN: CPT

## 2023-05-30 PROCEDURE — 93000 ELECTROCARDIOGRAM COMPLETE: CPT

## 2023-05-30 NOTE — REASON FOR VISIT
[CV Risk Factors and Non-Cardiac Disease] : CV risk factors and non-cardiac disease [Coronary Artery Disease] : coronary artery disease [Follow-Up - Clinic] : a clinic follow-up of [Hyperlipidemia] : hyperlipidemia [FreeTextEntry3] : Dr. Xie [FreeTextEntry1] : murmur

## 2023-05-30 NOTE — DISCUSSION/SUMMARY
[FreeTextEntry1] : This is a 73-year-old female past medical history significant for minor coronary artery calcification, probable heterozygous familial hypercholesterolemia, history of colon cancer, murmurs, comes in for cardiac follow up evaluation. She denies chest pain, shortness of breath, dizziness or syncope.\par Cardiac risk factors include hypercholesterolemia, ( both brothers also high cholesterol), and prediabetes).\par Electrocardiogram done May 30, 2023 demonstrated normal sinus rhythm rate 74 bpm is otherwise remarkable for left atrial abnormality.\par Blood work done May 9, 2023 demonstrated GFR 55 cc/min (the patient only has 1 kidney), and hemoglobin A1c of 5.7 identical to previous reading from November 2022.  Cholesterol 179, HDL 73, triglyceride 106, LDL direct 85 mg/dL and non-HDL cholesterol 105 mg/dL hemoglobin A1c of 5.7.\par The patient will continue on her current diet and exercise program.  She is very active.  She plans on going on a trip to [place] in the future.  She will continue on her current dose of Pravachol 40 mg daily and Zetia 10 mg daily.\par The patient's LDL cholesterol is at target of less than 100 mg/dL.\par The patient had a coronary artery calcium score of 3 units, probably representing a normal finding.\par The patient had a normal exercise stress test November 15, 2022.\par She had a normal nuclear stress test May 23, 2019.\par Echo Doppler examination done April 27, 2022 demonstrated calcified mitral annulus and minimal mitral valve regurgitation, minimal pulmonic valve regurgitation, mild tricuspid valve regurgitation, trace aortic valve regurgitation, thickened aortic valve leaflets.  The estimated ejection fraction was 64%.\par Lipid panel done November 14, 2022 demonstrated cholesterol of 166, HDL of 73, LDL of 80, non-HDL 93 mg/dL and triglycerides of 64 mg/dL.\par \par PMH:\par The patient had a transient loss of consciousness in May 2022.  She felt that she was hypoglycemic and dehydrated.  She felt it coming on, and a transient loss of consciousness.\par Electrocardiogram done June 8, 2022 demonstrated normal sinus rhythm rate 78 bpm and is otherwise remarkable for left atrial abnormality.\par .\par Coronary artery calcium score done May 14, 2019 demonstrated 3 units located in the left anterior descending artery.\par She understands she must maintain good hydration.\par Electrocardiogram done November 9, 2021 demonstrate normal sinus rhythm rate of 82 bpm is otherwise unremarkable.\par Lipid panel done November 1, 2021 demonstrated cholesterol 156, HDL 63, LDL calculated 73 mg/dL, and triglycerides 97 mg/dL, non-HDL cholesterol 92 mg/dL, and direct LDL cholesterol of 72 mg/dL.  Hemoglobin A1c was 6.0 consistent with prediabetes.\par The patient had a normal exercise stress test 12/5/2018. \par Electrocardiogram done April 27, 2021 demonstrates normal sinus rhythm rate 74 bpm is otherwise unremarkable.\par Blood work done April 19, 2021, demonstrated cholesterol 157, HDL 69, triglycerides of 56, and LDL direct of 77 mg/dL which is at target level for FH, with an LDL target of less than 100 mg/dL.  She will follow-up with me after the above-noted statin vacation is completed\par Lifestyle modification including regular aerobic activity and dietary changes was reinforced.\par \par PMH:\par The patient is tolerating Crestor 20 mg per day.  She had blood work done March 16, 2020 which demonstrated a calculated LDL of 76 mg/dL, cholesterol 136 mg/dL, HDL 42 mg/dL, and triglycerides of 91 mg/dL.  This was a marked improvement over her previous blood work from January 30, 2020 which demonstrated a total cholesterol of 288 mg/dL, calculated  mg/dL and a direct LDL of 197 mg/dL.  The patient has had a previous elevation of total cholesterol to 301 mg/dL with an LDL of 194 mg/dL in December 2018.\par The patient does not wish to be on a 20 mg dose of Crestor despite my recommendation of continuing her on the current dosage.  I have explained to her that she is a cholesterol goal for patient with heterozygous familial hypercholesterolemia.  She does not wish to stay on the 20 mg dose and will change to Crestor 10 mg per day and continue a prudent diet.  She will also work with the registered dietitian.\par PMH:\par The patient had blood work done January 13, 2020 which demonstrated total cholesterol of 288 mg/dL, HDL 64 mg/dL, calculated  mg/dL, triglycerides under 26 mg/dL and a direct LDL cholesterol 197 mg/dL.  \par \par \par She has significantly elevated total cholesterol, with an LDL cholesterol 194 mg/dL, total cholesterol 301 mg/dL. \par She does have a family history of hypercholesterolemia involving siblings. She also reports that one of her 2 children and high cholesterol as well. \par I have explained to her that her children and siblings may have familial hypercholesterolemia should be screened as well.  They may require medical therapy to\par \par Blood work done July 13, 2018 demonstrated total cholesterol 273 mg/dL, LDL calculated cholesterol 193 mg/dL, HDL 55 mg/dL, triglyceride 126 mg/dL, hemoglobin A1c of 5.4.\par The finding of LDL cholesterol greater than 190 is consistent with familial heterozygous hypercholesterolemia. The patient reports that she has had LDL cholesterols in the past of 140 mg/dL. A measure of direct LDL cholesterol is more accurate as usually higher than the calculated number. Blood work was done today for direct LDL cholesterol.\par \par The patient understands that aerobic exercises must be increased to 40 minutes 4 times per week. A detailed discussion of lifestyle modification was done today. The patient has a good understanding of the diagnosis, and treatment plan. Lifestyle modification was also outlined.

## 2023-06-12 ENCOUNTER — APPOINTMENT (OUTPATIENT)
Dept: PULMONOLOGY | Facility: CLINIC | Age: 73
End: 2023-06-12
Payer: MEDICARE

## 2023-06-12 ENCOUNTER — NON-APPOINTMENT (OUTPATIENT)
Age: 73
End: 2023-06-12

## 2023-06-12 VITALS
RESPIRATION RATE: 16 BRPM | WEIGHT: 140 LBS | TEMPERATURE: 97.2 F | HEART RATE: 74 BPM | BODY MASS INDEX: 24.8 KG/M2 | HEIGHT: 63 IN | DIASTOLIC BLOOD PRESSURE: 74 MMHG | OXYGEN SATURATION: 96 % | SYSTOLIC BLOOD PRESSURE: 110 MMHG

## 2023-06-12 DIAGNOSIS — R06.83 SNORING: ICD-10-CM

## 2023-06-12 DIAGNOSIS — U07.1 COVID-19: ICD-10-CM

## 2023-06-12 PROCEDURE — 99214 OFFICE O/P EST MOD 30 MIN: CPT | Mod: 25

## 2023-06-12 PROCEDURE — 95012 NITRIC OXIDE EXP GAS DETER: CPT

## 2023-06-12 PROCEDURE — 94010 BREATHING CAPACITY TEST: CPT

## 2023-06-12 NOTE — ASSESSMENT
[FreeTextEntry1] : Ms. LUJAN is a 73 year old female with a history of colon cancer (stage 3) s/p chemotherapy and resection, HLD, heart murmur, osteopenia, and poor sleep who now comes to the office for a follow-up pulmonary evaluation  (stable). Her number one issue is allergies - likely\par \par Her shortness of breath is multifactorial due to:\par -poor mechanics of breathing \par -out of shape / overweight\par -pulmonary disease\par -cardiac disease \par \par problem 1: moderate  asthma- stable\par -add Ventolin 2 puffs Q6H, pre-exercise \par -Asthma is believed to be caused by inherited (genetic) and environmental factor, but its exact cause is unknown. Asthma may be triggered by allergens, lung infections, or irritants in the air. Asthma triggers are different for each person \par -Inhaler technique reviewed as well as oral hygiene techniques reviewed with patient. Avoidance of cold air, extremes of temperature, rescue inhaler should be used before exercise. Order of medication reviewed with patient. Recommended use of a cool mist humidifier in the bedroom \par \par Problem 1A: Elevated IgE \par - candidate for dupixent\par Dupixent is a prescription medicine used with other asthma medicines for the maintenance treatment of moderate-to-severe asthma in people aged 12 years and older whose asthma is not controlled with their current asthma medicines. Dupixent helps prevent severe asthma attacks (exacerbations) and can improve your breathing. Dupixent may also help reduce the amount of oral corticosteroids you need while preventing severe asthma attacks and improving your breathing. Dupixent is not used to treat sudden breathing problems. Risks and side effect of Dupixent were discussed and reviewed with patient. \par \par Problem 1B: Eosinophilic asthma\par - candidate for nucala / fasenra\par -The safety and efficacy of Nucala was established in three double-blind, randomized, placebo-controlled trials in patients with severe asthma. Compared to a placebo, patients with severe asthma receiving Nucala had fewer exacerbation requiring hospitalization and/or emergency department visits, and a longer time to first exacerbation.  In addition, patients with severe asthma receiving Nucala or Fasenra experienced greater reductions in their daily maintenance oral corticosteroid dose, while maintaining asthma control compared with patients receiving placebo. Treatment with Nucala did not result in a significant improvement in lung function, as measured by the volume of air exhaled by patients in one second. The most common side effects include: headache, injection site reactions, back pain, weakness, and fatigue; hypersensitivity reactions can occur within hours or days including swelling of the face, mouth, and tongue, fainting, dizziness, hives, breathing problems, and rash; herpes zoster infections have occurred. The drug is a monoclonal antibody that inhibits interleukin-5 which helps regular eosinophils, a type of white blood cell that contributes to asthma. The over-production of eosinophils can cause inflammation in the lungs, increasing the frequency of asthma attacks. Patients must also take other medications, including high dose inhaled corticosteroids and at least one additional asthma drug\par  \par \par problem 2: allergy sinus \par -s/p Blood work to include: asthma panel (+), food IgE panel, IgE level (+), eosinophil level (+), vitamin D level \par -continue Olopatadine 0.6% 1 sniff BID / Qnasl 1 sniff BID or Atrovent 0.6% at 1 sniff/nostril, up to TID \par -Add Clarinex 5 mg before bed \par -Environmental measures for allergies were encouraged including mattress and pillow cover, air purifier, and environmental controls \par \par problem 3: poor sleep - r/o ?OSAS\par -get Home sleep study, if HSS c/w LOVELY, consider Oral Appliance if needed\par Sleep apnea is associated with adverse clinical consequences which an affect most organ systems. Cardiovascular disease risk includes arrhythmias, atrial fibrillation, hypertension, coronary artery disease, and stroke. Metabolic disorders include diabetes type 2, non-alcoholic fatty liver disease. Mood disorder especially depression; and cognitive decline especially in the elderly. Associations with chronic reflux/Boswell’s esophagus some but not all inclusive. \par -Reasons include arousal consistent with hypopnea; respiratory events most prominent in REM sleep or supine position; therefore sleep staging and body position are important for accurate diagnosis and estimation of AHI.\par -Good sleep hygiene was encouraged including avoiding watching television an hour before bed, keeping caffeine at a low, avoiding reading, television, or anything, in bed, no drinking any liquids three hours before bedtime, and only getting into bed when tired and ready for sleep.\par \par problem 4: cardiac disease\par -recommended to continue to follow up with Cardiologist - Dr. Linwood Lambert\par \par problem 5: poor breathing mechanics\par -Recommended Wim Hof and Buteyko breathing techniques \par -Proper breathing techniques were reviewed with an emphasis of exhalation. Patient instructed to breath in for 1 second and out for four seconds. Patient was encouraged to not talk while walking. \par \par problem 6: out of shape / overweight\par -Weight loss, exercise, and diet control were discussed and are highly encouraged. Treatment options were given such as, aqua therapy, and contacting a nutritionist. Recommended to use the elliptical, stationary bike, less use of treadmill. Mindful eating was explained to the patient Obesity is associated with worsening asthma, shortness of breath, and potential for cardiac disease, diabetes, and other underlying medical conditions\par \par Problem 7a: COVID-19 precautionary Immune Support Recommendations:\par - S/p Covid 19 vaccine (Moderna) x4\par -OTC Vitamin C 500mg BID \par -OTC Quercetin 250-500mg BID \par -OTC Zinc 75-100mg per day \par -OTC Melatonin 1 or 2mg a night \par -OTC Vitamin D 1-4000mg per day \par -OTC Tonic Water 8oz per day\par -Water 0.5-1 gallon per day\par \par problem 7: health maintenance \par -s/p covid 19 vaccine x4\par -Recommended not to get an additional COVID-19 booster at this time until it is updated against the current variants. If planning on travelling, obtaining another booster within 2 weeks of departure is recommended. \par -s/p yearly flu shot - 10/2021\par -recommended strep pneumonia vaccines: Prevnar-13 vaccine, followed by Pneumo vaccine 23 one year following\par -recommended early intervention for Upper Respiratory Infections (URIs)\par -recommended regular osteoporosis evaluations\par -recommended early dermatological evaluations\par -recommended after the age of 50 to the age of 70, colonoscopy every 5 years\par \par F/U in 3-4 months \par She is encouraged to call with any changes, concerns, or questions

## 2023-06-12 NOTE — ADDENDUM
[FreeTextEntry1] : Documented by Jasvir Goodwin acting as a scribe for Dr. Dony Mar on 06/12/2023.\par \par All medical record entries made by the Scribe were at my, Dr. Dony Mar's, direction and personally dictated by me on 06/12/2023. I have reviewed the chart and agree that the record accurately reflects my personal performance of the history, physical exam, assessment and plan. I have also personally directed, reviewed, and agree with the discharge instructions.

## 2023-06-12 NOTE — PROCEDURE
[FreeTextEntry1] : Feno was 25; a normal value being less than 25. Fractional exhaled nitric oxide (FENO) is regarded as a simple, noninvasive method for assessing eosinophilic airway inflammation. Produced by a variety of cells within the lung, nitric oxide (NO) concentrations are generally low in healthy individuals. However, high concentrations of NO appear to be involved in nonspecific host defense mechanisms and chronic inflammatory  diseases such as asthma. The American Thoracic Society (ATS) therefore recommended using FENO to aid in the diagnosis and monitoring of eosinophilic airway inflammation and asthma, and for identifying steroid responsive individuals whose chronic respiratory symptoms may be caused by airway inflammation \par \par PFT revealed normal flows, with a FEV1 of 1.80L, which is 88% of predicted, with a normal flow volume loop. -PFTs performed today for evaluation of SOB (06/12/2023)

## 2023-06-12 NOTE — HISTORY OF PRESENT ILLNESS
[FreeTextEntry1] : Ms. Calvillo is a 73 year old female with a history of asthma, allergies, WILLIS, elevated IgE, eosinophilic asthma, snoring, and SOB presenting to the office today for an follow-up visit. Her chief complaint is \par \par \par -she notes getting enough sleep  at times\par -she notes feeling generally well \par -she notes she went on a trip to [facility]\par -she notes her bowels are regular \par -she notes exercising \par -she notes back pain and neck pain, mostly lower back\par -she notes she had a low-grade headache for the past few days which might be related to allergies. \par \par -patient denies any nausea, vomiting, fever, chills, sweats, chest pain, chest pressure, palpitations, diarrhea, constipation, dysphagia, dizziness, leg swelling, leg pain

## 2023-07-11 DIAGNOSIS — Z29.8 ENCOUNTER FOR OTHER SPECIFIED PROPHYLACTIC MEASURES: ICD-10-CM

## 2023-09-23 NOTE — ADDENDUM
[FreeTextEntry1] : Documented by Amie Abraham acting as a scribe for Dr. Dony Mar on 11/02/2020 \par \par All medical record entries made by the Scribe were at my, Dr. Dony Mar's, direction and personally dictated by me on 11/02/2020 . I have reviewed the chart and agree that the record accurately reflects my personal performance of the history, physical exam, assessment and plan. I have also personally directed, reviewed, and agree with the discharge instructions.  Pt. was given 2 tsp. of Moderately thick liquids. 1. Severe oral dysphagia marked by impaired oral grading of tsp. with anterior and lateral loss progressing past mid chin. Absent swallow initiation with no lingual movements noted. Suspected posterior loss of bolus. Additional suctioning of bolus trial from oral cavity provided. 2. Suspected pharyngeal dysphagia marked by absent swallow trigger with absent hyolaryngeal elevation via digital palpation. Suctioning provided.

## 2023-10-11 ENCOUNTER — TRANSCRIPTION ENCOUNTER (OUTPATIENT)
Age: 73
End: 2023-10-11

## 2023-10-16 ENCOUNTER — TRANSCRIPTION ENCOUNTER (OUTPATIENT)
Age: 73
End: 2023-10-16

## 2023-11-14 ENCOUNTER — TRANSCRIPTION ENCOUNTER (OUTPATIENT)
Age: 73
End: 2023-11-14

## 2023-11-15 ENCOUNTER — APPOINTMENT (OUTPATIENT)
Dept: CARDIOLOGY | Facility: CLINIC | Age: 73
End: 2023-11-15
Payer: MEDICARE

## 2023-11-15 VITALS
BODY MASS INDEX: 24.8 KG/M2 | HEIGHT: 63 IN | DIASTOLIC BLOOD PRESSURE: 79 MMHG | WEIGHT: 140 LBS | RESPIRATION RATE: 16 BRPM | SYSTOLIC BLOOD PRESSURE: 121 MMHG | OXYGEN SATURATION: 96 % | HEART RATE: 78 BPM | TEMPERATURE: 97.7 F

## 2023-11-15 DIAGNOSIS — R06.09 OTHER FORMS OF DYSPNEA: ICD-10-CM

## 2023-11-15 DIAGNOSIS — R73.03 PREDIABETES.: ICD-10-CM

## 2023-11-15 PROCEDURE — 99213 OFFICE O/P EST LOW 20 MIN: CPT | Mod: 25

## 2023-11-15 PROCEDURE — 93015 CV STRESS TEST SUPVJ I&R: CPT

## 2023-11-30 ENCOUNTER — TRANSCRIPTION ENCOUNTER (OUTPATIENT)
Age: 73
End: 2023-11-30

## 2023-12-18 ENCOUNTER — RX RENEWAL (OUTPATIENT)
Age: 73
End: 2023-12-18

## 2023-12-21 ENCOUNTER — APPOINTMENT (OUTPATIENT)
Dept: ORTHOPEDIC SURGERY | Facility: CLINIC | Age: 73
End: 2023-12-21
Payer: MEDICARE

## 2023-12-21 VITALS
BODY MASS INDEX: 23.39 KG/M2 | SYSTOLIC BLOOD PRESSURE: 122 MMHG | WEIGHT: 137 LBS | HEART RATE: 91 BPM | HEIGHT: 64 IN | DIASTOLIC BLOOD PRESSURE: 81 MMHG

## 2023-12-21 DIAGNOSIS — M77.11 LATERAL EPICONDYLITIS, RIGHT ELBOW: ICD-10-CM

## 2023-12-21 PROCEDURE — 73080 X-RAY EXAM OF ELBOW: CPT | Mod: RT

## 2023-12-21 PROCEDURE — 99204 OFFICE O/P NEW MOD 45 MIN: CPT | Mod: 25

## 2023-12-21 PROCEDURE — 20605 DRAIN/INJ JOINT/BURSA W/O US: CPT | Mod: RT

## 2023-12-21 NOTE — HISTORY OF PRESENT ILLNESS
[de-identified] : 73-year-old RHD female presents for initial evaluation of right elbow pain x several weeks. She denies trauma or injury. She complains of intermittent sharp pain over the lateral aspect of the elbow worse with full extension and holding an object for a prolonged period. She has tried Ibuprofen and heat with some relief. Denies paresthesias. Denies prior injuries. She reports similar symptoms in the left elbow several years ago for which she saw Dr. Summers, had a cortisone injection with great relief. She is interested in a cortisone injection for her right elbow today.

## 2023-12-21 NOTE — DISCUSSION/SUMMARY
[de-identified] : The patient has lateral epicondylitis of the right elbow.  I have discussed the pathology, natural history and treatment options with her.  She is opted to receive a corticosteroid injection. Site and procedure were confirmed with the patient. With informed consent and following a sterile prep an injection was placed at the right lateral epicondyle. The injection consisted of 1 cc of Depo-Medrol and 2 cc of 1% Xylocaine. The injection was well tolerated. Instructions were given. She will work on stretching exercises for her forearm.  If not improved in 2 to 3 weeks she will consider physical therapy.  She prefers not to take NSAIDs as she has only 1 kidney.

## 2023-12-21 NOTE — PHYSICAL EXAM
[Rad] : radial 2+ and symmetric bilaterally [Normal] : Alert and in no acute distress [Poor Appearance] : well-appearing [Acute Distress] : not in acute distress [Obese] : not obese [de-identified] : The patient has no respiratory distress. Mood and affect are normal. The patient is alert and oriented to person, place and time. Examination of the cervical spine demonstrates no tenderness, no deformity and no muscle spasm. Cervical spine rotation is 60 to the right, 60 to the left, 75 of extension and 45 of flexion. Neurologic exam of the upper extremities reveals intact sensation to light touch. Motor function is 5 over 5 in all groups. Deep tendon reflexes are 2+ and equal at the biceps, triceps and brachioradialis. Examination of the right shoulder demonstrates no swelling, no deformity and no tenderness. The shoulder is stable. Drop arm test is negative. [place] test is negative. Liftoff test is negative. Motor strength is 5 over 5 in all groups. Range of motion is full and identical to that of the left shoulder. Flexion is 160, abduction 160, external rotation 45 and internal rotation to the lower thoracic level. The right elbow is tender over the lateral epicondyle. There is no deformity. The elbow is stable. Range of motion is 0-130. Pronation and supination are full. There is increased lateral elbow pain with wrist extension against resistance. The skin is intact. There is no rash. [de-identified] : AP, lateral and oblique x-rays of the right elbow demonstrate no fracture, no dislocation and no bony abnormality.

## 2024-01-29 ENCOUNTER — APPOINTMENT (OUTPATIENT)
Dept: PULMONOLOGY | Facility: CLINIC | Age: 74
End: 2024-01-29
Payer: MEDICARE

## 2024-01-29 VITALS
RESPIRATION RATE: 16 BRPM | OXYGEN SATURATION: 95 % | HEART RATE: 85 BPM | BODY MASS INDEX: 23.39 KG/M2 | TEMPERATURE: 97.4 F | HEIGHT: 64 IN | DIASTOLIC BLOOD PRESSURE: 76 MMHG | SYSTOLIC BLOOD PRESSURE: 120 MMHG | WEIGHT: 137 LBS

## 2024-01-29 DIAGNOSIS — J45.40 MODERATE PERSISTENT ASTHMA, UNCOMPLICATED: ICD-10-CM

## 2024-01-29 DIAGNOSIS — J82.83 EOSINOPHILIC ASTHMA: ICD-10-CM

## 2024-01-29 DIAGNOSIS — J06.9 ACUTE UPPER RESPIRATORY INFECTION, UNSPECIFIED: ICD-10-CM

## 2024-01-29 DIAGNOSIS — R76.8 OTHER SPECIFIED ABNORMAL IMMUNOLOGICAL FINDINGS IN SERUM: ICD-10-CM

## 2024-01-29 DIAGNOSIS — R06.02 SHORTNESS OF BREATH: ICD-10-CM

## 2024-01-29 DIAGNOSIS — J30.9 ALLERGIC RHINITIS, UNSPECIFIED: ICD-10-CM

## 2024-01-29 PROCEDURE — 99214 OFFICE O/P EST MOD 30 MIN: CPT | Mod: 25

## 2024-01-29 PROCEDURE — 95012 NITRIC OXIDE EXP GAS DETER: CPT

## 2024-01-29 PROCEDURE — 94010 BREATHING CAPACITY TEST: CPT

## 2024-01-29 PROCEDURE — 90471 IMMUNIZATION ADMIN: CPT

## 2024-01-29 PROCEDURE — 90715 TDAP VACCINE 7 YRS/> IM: CPT | Mod: GY

## 2024-01-29 NOTE — ASSESSMENT
[FreeTextEntry1] : Ms. LUJAN is a 73 year old female with a history of colon cancer (stage 3) s/p chemotherapy and resection, HLD, heart murmur, osteopenia, and poor sleep who now comes to the office for a follow-up pulmonary evaluation (stable). Her number one issue is L buttocks pain/bone spurs on her toes  Her shortness of breath is multifactorial due to: -poor mechanics of breathing -out of shape / overweight -pulmonary disease    -moderate asthma -cardiac disease  problem 1: moderate asthma- stable -Ventolin 2 puffs Q6H, pre-exercise -Asthma is believed to be caused by inherited (genetic) and environmental factor, but its exact cause is unknown. Asthma may be triggered by allergens, lung infections, or irritants in the air. Asthma triggers are different for each person -Inhaler technique reviewed as well as oral hygiene techniques reviewed with patient. Avoidance of cold air, extremes of temperature, rescue inhaler should be used before exercise. Order of medication reviewed with patient. Recommended use of a cool mist humidifier in the bedroom  Problem 1A: Elevated IgE - candidate for Dupixent Dupixent is a prescription medicine used with other asthma medicines for the maintenance treatment of moderate-to-severe asthma in people aged 12 years and older whose asthma is not controlled with their current asthma medicines. Dupixent helps prevent severe asthma attacks (exacerbations) and can improve your breathing. Dupixent may also help reduce the amount of oral corticosteroids you need while preventing severe asthma attacks and improving your breathing. Dupixent is not used to treat sudden breathing problems. Risks and side effect of Dupixent were discussed and reviewed with patient.  Problem 1B: Eosinophilic asthma (quiet) - candidate for nucala / fasenra -The safety and efficacy of Nucala was established in three double-blind, randomized, placebo-controlled trials in patients with severe asthma. Compared to a placebo, patients with severe asthma receiving Nucala had fewer exacerbation requiring hospitalization and/or emergency department visits, and a longer time to first exacerbation. In addition, patients with severe asthma receiving Nucala or Fasenra experienced greater reductions in their daily maintenance oral corticosteroid dose, while maintaining asthma control compared with patients receiving placebo. Treatment with Nucala did not result in a significant improvement in lung function, as measured by the volume of air exhaled by patients in one second. The most common side effects include: headache, injection site reactions, back pain, weakness, and fatigue; hypersensitivity reactions can occur within hours or days including swelling of the face, mouth, and tongue, fainting, dizziness, hives, breathing problems, and rash; herpes zoster infections have occurred. The drug is a monoclonal antibody that inhibits interleukin-5 which helps regular eosinophils, a type of white blood cell that contributes to asthma. The over-production of eosinophils can cause inflammation in the lungs, increasing the frequency of asthma attacks. Patients must also take other medications, including high dose inhaled corticosteroids and at least one additional asthma drug  problem 2: allergy sinus -s/p Blood work to include: asthma panel (+), food IgE panel, IgE level (+), eosinophil level (+), vitamin D level -continue Olopatadine 0.6% 1 sniff BID / Qnasl 1 sniff BID or Atrovent 0.6% at 1 sniff/nostril, up to TID -continue Clarinex 5 mg before bed -Environmental measures for allergies were encouraged including mattress and pillow cover, air purifier, and environmental controls  problem 3: poor sleep - r/o ?OSAS -get Home sleep study, if HSS c/w LOVELY, consider Oral Appliance if needed Sleep apnea is associated with adverse clinical consequences which an affect most organ systems. Cardiovascular disease risk includes arrhythmias, atrial fibrillation, hypertension, coronary artery disease, and stroke. Metabolic disorders include diabetes type 2, non-alcoholic fatty liver disease. Mood disorder especially depression; and cognitive decline especially in the elderly. Associations with chronic reflux/Boswell's esophagus some but not all inclusive. -Reasons include arousal consistent with hypopnea; respiratory events most prominent in REM sleep or supine position; therefore sleep staging and body position are important for accurate diagnosis and estimation of AHI. -Good sleep hygiene was encouraged including avoiding watching television an hour before bed, keeping caffeine at a low, avoiding reading, television, or anything, in bed, no drinking any liquids three hours before bedtime, and only getting into bed when tired and ready for sleep.  problem 4: cardiac disease -recommended to continue to follow up with Cardiologist - Dr. Linwood Lambert  problem 5: poor breathing mechanics -Recommended Wim Hof and Buteyko breathing techniques -Proper breathing techniques were reviewed with an emphasis of exhalation. Patient instructed to breath in for 1 second and out for four seconds. Patient was encouraged to not talk while walking.  problem 6: out of shape / overweight -Weight loss, exercise, and diet control were discussed and are highly encouraged. Treatment options were given such as, aqua therapy, and contacting a nutritionist. Recommended to use the elliptical, stationary bike, less use of treadmill. Mindful eating was explained to the patient Obesity is associated with worsening asthma, shortness of breath, and potential for cardiac disease, diabetes, and other underlying medical conditions  Problem 7a: COVID-19 precautionary Immune Support Recommendations: - S/p Covid 19 vaccine (Moderna) x5 -OTC Vitamin C 500mg BID -OTC Quercetin 250-500mg BID -OTC Zinc 75-100mg per day -OTC Melatonin 1 or 2mg a night -OTC Vitamin D 1-4000mg per day -OTC Tonic Water 8oz per day -Water 0.5-1 gallon per day  problem 7: health maintenance -s/p covid 19 vaccine x5 -Recommended not to get an additional COVID-19 booster at this time until it is updated against the current variants. If planning on travelling, obtaining another booster within 2 weeks of departure is recommended. -s/p yearly flu shot - 10/2023 -s/p RSV vaccine 2023 -TDaP vaccine given in office 01/29/2024 -s/p Shingrix vaccines 2023 -recommended strep pneumonia vaccines: Prevnar-13 vaccine, followed by Pneumo vaccine 23 one year following -recommended early intervention for Upper Respiratory Infections (URIs) -recommended regular osteoporosis evaluations -recommended early dermatological evaluations -recommended after the age of 50 to the age of 70, colonoscopy every 5 years  F/P in 3-4 months She is encouraged to call with any changes, concerns, or questions.

## 2024-01-29 NOTE — PROCEDURE
[FreeTextEntry1] : PFTs revealed normal flows; FEV1 was 1.66L, which is 77.6% of predicted; normal flow volume loop. PFTs were performed to evaluate for asthma  FENO was 24; a normal value being less than 25 Fractional exhaled nitric oxide (FENO) is regarded as a simple, noninvasive method for assessing eosinophilic airway inflammation. Produced by a variety of cells within the lung, nitric oxide (NO) concentrations are generally low in healthy individuals. However, high concentrations of NO appear to be involved in nonspecific host defense mechanisms and chronic inflammatory diseases such as asthma. The American Thoracic Society (ATS) therefore has recommended using FENO to aid in the diagnosis and monitoring of eosinophilic airway inflammation and asthma, and for identifying steroid responsive individuals whose chronic respiratory symptoms may be caused by airway inflammation.

## 2024-01-29 NOTE — HISTORY OF PRESENT ILLNESS
[FreeTextEntry1] : Ms. Calvillo is a 73 year old female with a history of asthma, allergies, WILLIS, elevated IgE, eosinophilic asthma, snoring, and SOB presenting to the office today for a follow-up pulmonary evaluation. Her chief complaint is   -she notes feeling generally well  -she notes there was a "hard ball" that developed over her belly button, lasting for 20 minutes. She believes it was caused by a "gas attack" she had. It resolved and hasn't returned since. -she notes she returned from [facility] mid-January 2024 -she notes her breathing was stable in [facility]. She climbed a mountain with her son without any limitations. -she notes bone spurs on her toes are her biggest issue. She wears orthotics and special sneakers, which allow her to walk -she notes having L buttocks pain and lower back pain  -she denies any headaches, nausea, emesis, fever, chills, sweats, chest pain, chest pressure, coughing, wheezing, palpitations, diarrhea, constipation, dysphagia, vertigo, leg swelling, itchy eyes, itchy ears, heartburn, reflux, or sour taste in the mouth.

## 2024-01-29 NOTE — ADDENDUM
[FreeTextEntry1] : Documented by Sayra Douglas acting as a scribe for Dr. Dony Mar on 01/29/2024. All medical record entries made by the Scribe were at my, Dr. Dony Mar's, direction and personally dictated by me on 01/29/2024. I have reviewed the chart and agree that the record accurately reflects my personal performance of the history, physical exam, assessment and plan. I have also personally directed, reviewed, and agree with the discharge instructions.

## 2024-04-16 ENCOUNTER — APPOINTMENT (OUTPATIENT)
Dept: GERIATRICS | Facility: CLINIC | Age: 74
End: 2024-04-16
Payer: MEDICARE

## 2024-04-16 VITALS
OXYGEN SATURATION: 98 % | SYSTOLIC BLOOD PRESSURE: 119 MMHG | DIASTOLIC BLOOD PRESSURE: 77 MMHG | HEIGHT: 64 IN | WEIGHT: 141 LBS | HEART RATE: 82 BPM | BODY MASS INDEX: 24.07 KG/M2 | TEMPERATURE: 97.3 F

## 2024-04-16 DIAGNOSIS — J45.909 UNSPECIFIED ASTHMA, UNCOMPLICATED: ICD-10-CM

## 2024-04-16 DIAGNOSIS — F41.0 PANIC DISORDER [EPISODIC PAROXYSMAL ANXIETY]: ICD-10-CM

## 2024-04-16 DIAGNOSIS — E78.5 HYPERLIPIDEMIA, UNSPECIFIED: ICD-10-CM

## 2024-04-16 DIAGNOSIS — F41.9 ANXIETY DISORDER, UNSPECIFIED: ICD-10-CM

## 2024-04-16 PROCEDURE — 99214 OFFICE O/P EST MOD 30 MIN: CPT

## 2024-04-16 PROCEDURE — G0444 DEPRESSION SCREEN ANNUAL: CPT

## 2024-04-16 RX ORDER — ATOVAQUONE AND PROGUANIL HYDROCHLORIDE 250; 100 MG/1; MG/1
250-100 TABLET, FILM COATED ORAL DAILY
Qty: 23 | Refills: 0 | Status: COMPLETED | COMMUNITY
Start: 2023-07-11 | End: 2024-04-16

## 2024-04-16 RX ORDER — ALPRAZOLAM 0.25 MG/1
0.25 TABLET ORAL
Qty: 30 | Refills: 0 | Status: ACTIVE | COMMUNITY
Start: 2024-04-16 | End: 1900-01-01

## 2024-04-17 NOTE — HISTORY OF PRESENT ILLNESS
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

## 2024-04-18 PROBLEM — E78.5 HYPERLIPIDEMIA: Status: ACTIVE | Noted: 2020-02-04

## 2024-04-18 PROBLEM — F41.9 ANXIETY: Status: ACTIVE | Noted: 2024-04-16

## 2024-04-18 PROBLEM — F41.0 PANIC ATTACKS: Status: ACTIVE | Noted: 2024-04-18

## 2024-04-18 PROBLEM — J45.909 ASTHMA: Status: ACTIVE | Noted: 2019-11-21

## 2024-04-29 ENCOUNTER — APPOINTMENT (OUTPATIENT)
Dept: CARDIOLOGY | Facility: CLINIC | Age: 74
End: 2024-04-29
Payer: MEDICARE

## 2024-04-29 ENCOUNTER — NON-APPOINTMENT (OUTPATIENT)
Age: 74
End: 2024-04-29

## 2024-04-29 VITALS
RESPIRATION RATE: 16 BRPM | TEMPERATURE: 97.7 F | SYSTOLIC BLOOD PRESSURE: 122 MMHG | OXYGEN SATURATION: 96 % | DIASTOLIC BLOOD PRESSURE: 78 MMHG | BODY MASS INDEX: 23.61 KG/M2 | WEIGHT: 138.31 LBS | HEART RATE: 74 BPM | HEIGHT: 64 IN

## 2024-04-29 DIAGNOSIS — I34.1 NONRHEUMATIC MITRAL (VALVE) PROLAPSE: ICD-10-CM

## 2024-04-29 DIAGNOSIS — Z78.9 OTHER SPECIFIED HEALTH STATUS: ICD-10-CM

## 2024-04-29 DIAGNOSIS — I25.84 ATHEROSCLEROTIC HEART DISEASE OF NATIVE CORONARY ARTERY W/OUT ANGINA PECTORIS: ICD-10-CM

## 2024-04-29 DIAGNOSIS — I07.1 RHEUMATIC TRICUSPID INSUFFICIENCY: ICD-10-CM

## 2024-04-29 DIAGNOSIS — I25.10 ATHEROSCLEROTIC HEART DISEASE OF NATIVE CORONARY ARTERY W/OUT ANGINA PECTORIS: ICD-10-CM

## 2024-04-29 DIAGNOSIS — E78.01 FAMILIAL HYPERCHOLESTEROLEMIA: ICD-10-CM

## 2024-04-29 PROCEDURE — 99214 OFFICE O/P EST MOD 30 MIN: CPT

## 2024-04-29 PROCEDURE — 93000 ELECTROCARDIOGRAM COMPLETE: CPT

## 2024-04-29 PROCEDURE — G2211 COMPLEX E/M VISIT ADD ON: CPT

## 2024-04-29 NOTE — DISCUSSION/SUMMARY
[FreeTextEntry1] : This is a 74-year-old female past medical history significant for minor coronary artery calcification, probable heterozygous familial hypercholesterolemia, history of colon cancer, murmurs, comes in for cardiac follow up evaluation. She denies chest pain, shortness of breath, dizziness or syncope. Cardiac risk factors include hypercholesterolemia, ( both brothers also high cholesterol), and prediabetes). Due to computer glitch, blood pressure cannot be ended in the vital sign area. Blood pressure 118/72. Electrocardiogram done April 29, 2024 demonstrated normal sinus rhythm rate 74 bpm is otherwise remarkable for poor R wave progression. The patient will be going for new blood work including lipid panel in the next month. At some point she plans on going on a river cruise. The patient had a normal exercise stress test November 15, 2023. Lipid panel done May 9, 2023 demonstrated cholesterol of 179, HDL 73, triglycerides of 106, LDL calculated 84, LDL direct 85 mg/dL with hemoglobin A1c of 5.7 and normal liver function tests. Blood work done November 2, 2023 by her hematologist demonstrated an ALT of 38, AST of 37, (elevated based on the laboratory used with the upper limits of normal being 33 units/L) GFR 51 cc/min which is decreased from 55 cc/min in 2022, HDL 68, cholesterol 168, LDL direct 78, triglycerides 108 mg/dL. I have reassured her that the liver function tests measured by her hematologist have unusual limits of normal.  Her current liver function test would be acceptable and [facility] labs, [facility] etc.   She is currently hemodynamically stable and asymptomatic from a cardiac standpoint. Electrocardiogram done May 30, 2023 demonstrated normal sinus rhythm rate 74 bpm is otherwise remarkable for left atrial abnormality. Blood work done May 9, 2023 demonstrated GFR 55 cc/min (the patient only has 1 kidney), and hemoglobin A1c of 5.7 identical to previous reading from November 2022.  Cholesterol 179, HDL 73, triglyceride 106, LDL direct 85 mg/dL and non-HDL cholesterol 105 mg/dL hemoglobin A1c of 5.7. The patient will continue on her current diet and exercise program.  She is very active.  She plans on going on a trip to [place] in the future.  She will continue on her current dose of Pravachol 40 mg daily and Zetia 10 mg daily. The patient's LDL cholesterol is at target of less than 100 mg/dL. The patient had a coronary artery calcium score of 3 units, probably representing a normal finding. The patient had a normal exercise stress test November 15, 2022. She had a normal nuclear stress test May 23, 2019. Echo Doppler examination done April 27, 2022 demonstrated calcified mitral annulus and minimal mitral valve regurgitation, minimal pulmonic valve regurgitation, mild tricuspid valve regurgitation, trace aortic valve regurgitation, thickened aortic valve leaflets.  The estimated ejection fraction was 64%. Lipid panel done November 14, 2022 demonstrated cholesterol of 166, HDL of 73, LDL of 80, non-HDL 93 mg/dL and triglycerides of 64 mg/dL.  PMH: The patient had a transient loss of consciousness in May 2022.  She felt that she was hypoglycemic and dehydrated.  She felt it coming on, and a transient loss of consciousness. Electrocardiogram done June 8, 2022 demonstrated normal sinus rhythm rate 78 bpm and is otherwise remarkable for left atrial abnormality. . Coronary artery calcium score done May 14, 2019 demonstrated 3 units located in the left anterior descending artery. She understands she must maintain good hydration. Electrocardiogram done November 9, 2021 demonstrate normal sinus rhythm rate of 82 bpm is otherwise unremarkable. Lipid panel done November 1, 2021 demonstrated cholesterol 156, HDL 63, LDL calculated 73 mg/dL, and triglycerides 97 mg/dL, non-HDL cholesterol 92 mg/dL, and direct LDL cholesterol of 72 mg/dL.  Hemoglobin A1c was 6.0 consistent with prediabetes. The patient had a normal exercise stress test 12/5/2018.  Electrocardiogram done April 27, 2021 demonstrates normal sinus rhythm rate 74 bpm is otherwise unremarkable. Blood work done April 19, 2021, demonstrated cholesterol 157, HDL 69, triglycerides of 56, and LDL direct of 77 mg/dL which is at target level for FH, with an LDL target of less than 100 mg/dL.  She will follow-up with me after the above-noted statin vacation is completed Lifestyle modification including regular aerobic activity and dietary changes was reinforced.  PMH: The patient is tolerating Crestor 20 mg per day.  She had blood work done March 16, 2020 which demonstrated a calculated LDL of 76 mg/dL, cholesterol 136 mg/dL, HDL 42 mg/dL, and triglycerides of 91 mg/dL.  This was a marked improvement over her previous blood work from January 30, 2020 which demonstrated a total cholesterol of 288 mg/dL, calculated  mg/dL and a direct LDL of 197 mg/dL.  The patient has had a previous elevation of total cholesterol to 301 mg/dL with an LDL of 194 mg/dL in December 2018. The patient does not wish to be on a 20 mg dose of Crestor despite my recommendation of continuing her on the current dosage.  I have explained to her that she is a cholesterol goal for patient with heterozygous familial hypercholesterolemia.  She does not wish to stay on the 20 mg dose and will change to Crestor 10 mg per day and continue a prudent diet.  She will also work with the registered dietitian. PMH: The patient had blood work done January 13, 2020 which demonstrated total cholesterol of 288 mg/dL, HDL 64 mg/dL, calculated  mg/dL, triglycerides under 26 mg/dL and a direct LDL cholesterol 197 mg/dL.   She has significantly elevated total cholesterol, with an LDL cholesterol 194 mg/dL, total cholesterol 301 mg/dL.  She does have a family history of hypercholesterolemia involving siblings. She also reports that one of her 2 children and high cholesterol as well.  I have explained to her that her children and siblings may have familial hypercholesterolemia should be screened as well.  They may require medical therapy to  Blood work done July 13, 2018 demonstrated total cholesterol 273 mg/dL, LDL calculated cholesterol 193 mg/dL, HDL 55 mg/dL, triglyceride 126 mg/dL, hemoglobin A1c of 5.4. The finding of LDL cholesterol greater than 190 is consistent with familial heterozygous hypercholesterolemia. The patient reports that she has had LDL cholesterols in the past of 140 mg/dL. A measure of direct LDL cholesterol is more accurate as usually higher than the calculated number. Blood work was done today for direct LDL cholesterol.  The patient understands that aerobic exercises must be increased to 40 minutes 4 times per week. A detailed discussion of lifestyle modification was done today. The patient has a good understanding of the diagnosis, and treatment plan. Lifestyle modification was also outlined.

## 2024-04-29 NOTE — PHYSICAL EXAM
[Well Developed] : well developed [Well Nourished] : well nourished [No Acute Distress] : no acute distress This morning had an allergic reaction to unknown substance, noticed hives all over body. Epi pen was given at 0330. Fever x3 days with cough and nasal congestion. Tmax 101.7. Motrin-0230. Allergy: fish and eggs. No PMH. Clear BS with no signs of distress noted in triage. [Normal Venous Pressure] : normal venous pressure [No Carotid Bruit] : no carotid bruit [Normal S1, S2] : normal S1, S2 [No Rub] : no rub [Rhythm Regular] : regular [No Gallop] : no gallop heard [I] : a grade 1 [Clear Lung Fields] : clear lung fields [Good Air Entry] : good air entry [No Respiratory Distress] : no respiratory distress  [Soft] : abdomen soft [Non Tender] : non-tender [No Masses/organomegaly] : no masses/organomegaly [Normal Bowel Sounds] : normal bowel sounds [Normal Gait] : normal gait [No Edema] : no edema [No Cyanosis] : no cyanosis [No Clubbing] : no clubbing [No Varicosities] : no varicosities [No Rash] : no rash [No Skin Lesions] : no skin lesions [Moves all extremities] : moves all extremities [No Focal Deficits] : no focal deficits [Normal Speech] : normal speech [Alert and Oriented] : alert and oriented [Normal memory] : normal memory [General Appearance - Well Developed] : well developed [Normal Appearance] : normal appearance [Well Groomed] : well groomed [General Appearance - Well Nourished] : well nourished [No Deformities] : no deformities [General Appearance - In No Acute Distress] : no acute distress [Normal Conjunctiva] : the conjunctiva exhibited no abnormalities [Normal Oral Mucosa] : normal oral mucosa [No Oral Pallor] : no oral pallor [Normal Oropharynx] : normal oropharynx [Normal Jugular Venous A Waves Present] : normal jugular venous A waves present [Normal Jugular Venous V Waves Present] : normal jugular venous V waves present [No Jugular Venous Browne A Waves] : no jugular venous browne A waves [Exaggerated Use Of Accessory Muscles For Inspiration] : no accessory muscle use [5th Left ICS - MCL] : palpated at the 5th LICS in the midclavicular line [Normal] : normal [No Precordial Heave] : no precordial heave was noted [Normal Rate] : normal [Normal S1] : normal S1 [Normal S2] : normal S2 [No Murmur] : no murmurs heard [2+] : left 2+ [No Abnormalities] : the abdominal aorta was not enlarged and no bruit was heard [No Pitting Edema] : no pitting edema present [Bowel Sounds] : normal bowel sounds [Abdomen Soft] : soft [Abnormal Walk] : normal gait [Gait - Sufficient For Exercise Testing] : the gait was sufficient for exercise testing [Nail Clubbing] : no clubbing of the fingernails [Cyanosis, Localized] : no localized cyanosis [Skin Color & Pigmentation] : normal skin color and pigmentation [Skin Turgor] : normal skin turgor [] : no rash [Oriented To Time, Place, And Person] : oriented to person, place, and time [Affect] : the affect was normal [Mood] : the mood was normal [Apical Thrill] : no thrill palpable at the apex [Click] : no click [Pericardial Rub] : no pericardial rub [S3] : no S3 [S4] : no S4 [Right Carotid Bruit] : no bruit heard over the right carotid [Left Carotid Bruit] : no bruit heard over the left carotid [Right Femoral Bruit] : no bruit heard over the right femoral artery [Left Femoral Bruit] : no bruit heard over the left femoral artery

## 2024-05-07 RX ORDER — IPRATROPIUM BROMIDE 42 UG/1
0.06 SPRAY NASAL
Qty: 1 | Refills: 2 | Status: DISCONTINUED | COMMUNITY
Start: 2023-11-30 | End: 2024-05-07

## 2024-06-24 ENCOUNTER — RX RENEWAL (OUTPATIENT)
Age: 74
End: 2024-06-24

## 2024-06-24 RX ORDER — PRAVASTATIN SODIUM 40 MG/1
40 TABLET ORAL
Qty: 30 | Refills: 0 | Status: ACTIVE | COMMUNITY
Start: 2021-02-09 | End: 1900-01-01

## 2024-06-24 RX ORDER — EZETIMIBE 10 MG/1
10 TABLET ORAL DAILY
Qty: 30 | Refills: 0 | Status: ACTIVE | COMMUNITY
Start: 2020-09-21 | End: 1900-01-01

## 2024-07-02 ENCOUNTER — APPOINTMENT (OUTPATIENT)
Dept: ORTHOPEDIC SURGERY | Facility: CLINIC | Age: 74
End: 2024-07-02
Payer: MEDICARE

## 2024-07-02 DIAGNOSIS — M77.11 LATERAL EPICONDYLITIS, RIGHT ELBOW: ICD-10-CM

## 2024-07-02 PROCEDURE — 73080 X-RAY EXAM OF ELBOW: CPT | Mod: RT

## 2024-07-02 PROCEDURE — 99214 OFFICE O/P EST MOD 30 MIN: CPT | Mod: 25

## 2024-07-02 PROCEDURE — 20610 DRAIN/INJ JOINT/BURSA W/O US: CPT | Mod: RT

## 2024-07-11 ENCOUNTER — LABORATORY RESULT (OUTPATIENT)
Age: 74
End: 2024-07-11

## 2024-07-15 ENCOUNTER — APPOINTMENT (OUTPATIENT)
Dept: PULMONOLOGY | Facility: CLINIC | Age: 74
End: 2024-07-15
Payer: MEDICARE

## 2024-07-15 VITALS
TEMPERATURE: 97.1 F | WEIGHT: 138 LBS | DIASTOLIC BLOOD PRESSURE: 72 MMHG | OXYGEN SATURATION: 96 % | SYSTOLIC BLOOD PRESSURE: 116 MMHG | RESPIRATION RATE: 16 BRPM | HEART RATE: 88 BPM | BODY MASS INDEX: 23.56 KG/M2 | HEIGHT: 64 IN

## 2024-07-15 DIAGNOSIS — J30.9 ALLERGIC RHINITIS, UNSPECIFIED: ICD-10-CM

## 2024-07-15 DIAGNOSIS — J45.909 UNSPECIFIED ASTHMA, UNCOMPLICATED: ICD-10-CM

## 2024-07-15 DIAGNOSIS — J45.40 MODERATE PERSISTENT ASTHMA, UNCOMPLICATED: ICD-10-CM

## 2024-07-15 DIAGNOSIS — R06.83 SNORING: ICD-10-CM

## 2024-07-15 DIAGNOSIS — R06.02 SHORTNESS OF BREATH: ICD-10-CM

## 2024-07-15 DIAGNOSIS — R76.8 OTHER SPECIFIED ABNORMAL IMMUNOLOGICAL FINDINGS IN SERUM: ICD-10-CM

## 2024-07-15 DIAGNOSIS — J82.83 EOSINOPHILIC ASTHMA: ICD-10-CM

## 2024-07-15 PROCEDURE — 95012 NITRIC OXIDE EXP GAS DETER: CPT

## 2024-07-15 PROCEDURE — 94729 DIFFUSING CAPACITY: CPT

## 2024-07-15 PROCEDURE — 99214 OFFICE O/P EST MOD 30 MIN: CPT | Mod: 25

## 2024-07-15 PROCEDURE — ZZZZZ: CPT

## 2024-07-15 PROCEDURE — 94727 GAS DIL/WSHOT DETER LNG VOL: CPT

## 2024-07-15 PROCEDURE — 94010 BREATHING CAPACITY TEST: CPT

## 2024-07-25 ENCOUNTER — TRANSCRIPTION ENCOUNTER (OUTPATIENT)
Age: 74
End: 2024-07-25

## 2024-08-01 ENCOUNTER — OUTPATIENT (OUTPATIENT)
Dept: OUTPATIENT SERVICES | Facility: HOSPITAL | Age: 74
LOS: 1 days | End: 2024-08-01
Payer: MEDICARE

## 2024-08-01 ENCOUNTER — APPOINTMENT (OUTPATIENT)
Dept: MAMMOGRAPHY | Facility: IMAGING CENTER | Age: 74
End: 2024-08-01
Payer: MEDICARE

## 2024-08-01 ENCOUNTER — RESULT REVIEW (OUTPATIENT)
Age: 74
End: 2024-08-01

## 2024-08-01 ENCOUNTER — APPOINTMENT (OUTPATIENT)
Dept: RADIOLOGY | Facility: IMAGING CENTER | Age: 74
End: 2024-08-01
Payer: MEDICARE

## 2024-08-01 DIAGNOSIS — Z12.39 ENCOUNTER FOR OTHER SCREENING FOR MALIGNANT NEOPLASM OF BREAST: ICD-10-CM

## 2024-08-01 DIAGNOSIS — Z98.41 CATARACT EXTRACTION STATUS, RIGHT EYE: Chronic | ICD-10-CM

## 2024-08-01 DIAGNOSIS — M85.80 OTHER SPECIFIED DISORDERS OF BONE DENSITY AND STRUCTURE, UNSPECIFIED SITE: ICD-10-CM

## 2024-08-01 DIAGNOSIS — Z98.890 OTHER SPECIFIED POSTPROCEDURAL STATES: Chronic | ICD-10-CM

## 2024-08-01 PROCEDURE — 77080 DXA BONE DENSITY AXIAL: CPT

## 2024-08-01 PROCEDURE — 77063 BREAST TOMOSYNTHESIS BI: CPT | Mod: 26

## 2024-08-01 PROCEDURE — 77085 DXA BONE DENSITY AXL VRT FX: CPT

## 2024-08-01 PROCEDURE — 77067 SCR MAMMO BI INCL CAD: CPT | Mod: 26

## 2024-08-01 PROCEDURE — 77063 BREAST TOMOSYNTHESIS BI: CPT

## 2024-08-01 PROCEDURE — 77085 DXA BONE DENSITY AXL VRT FX: CPT | Mod: 26

## 2024-08-01 PROCEDURE — 77067 SCR MAMMO BI INCL CAD: CPT

## 2024-08-13 ENCOUNTER — TRANSCRIPTION ENCOUNTER (OUTPATIENT)
Age: 74
End: 2024-08-13

## 2024-08-16 RX ORDER — CICLESONIDE 50 UG/1
50 SPRAY NASAL
Qty: 3 | Refills: 1 | Status: DISCONTINUED | COMMUNITY
Start: 2024-08-15 | End: 2024-08-16

## 2024-08-16 RX ORDER — FLUTICASONE PROPIONATE 50 UG/1
50 SPRAY, METERED NASAL TWICE DAILY
Qty: 3 | Refills: 1 | Status: ACTIVE | COMMUNITY
Start: 2024-08-16

## 2024-08-30 ENCOUNTER — TRANSCRIPTION ENCOUNTER (OUTPATIENT)
Age: 74
End: 2024-08-30

## 2024-10-30 DIAGNOSIS — I34.1 NONRHEUMATIC MITRAL (VALVE) PROLAPSE: ICD-10-CM

## 2024-10-30 DIAGNOSIS — I07.1 RHEUMATIC TRICUSPID INSUFFICIENCY: ICD-10-CM

## 2024-12-10 ENCOUNTER — TRANSCRIPTION ENCOUNTER (OUTPATIENT)
Age: 74
End: 2024-12-10

## 2024-12-17 ENCOUNTER — LABORATORY RESULT (OUTPATIENT)
Age: 74
End: 2024-12-17

## 2024-12-17 ENCOUNTER — APPOINTMENT (OUTPATIENT)
Dept: CARDIOLOGY | Facility: CLINIC | Age: 74
End: 2024-12-17
Payer: MEDICARE

## 2024-12-17 ENCOUNTER — NON-APPOINTMENT (OUTPATIENT)
Age: 74
End: 2024-12-17

## 2024-12-17 VITALS
BODY MASS INDEX: 23.9 KG/M2 | SYSTOLIC BLOOD PRESSURE: 116 MMHG | HEART RATE: 74 BPM | TEMPERATURE: 98 F | RESPIRATION RATE: 16 BRPM | HEIGHT: 64 IN | WEIGHT: 140 LBS | OXYGEN SATURATION: 96 % | DIASTOLIC BLOOD PRESSURE: 71 MMHG

## 2024-12-17 DIAGNOSIS — Z86.79 PERSONAL HISTORY OF OTHER DISEASES OF THE CIRCULATORY SYSTEM: ICD-10-CM

## 2024-12-17 DIAGNOSIS — I25.10 ATHEROSCLEROTIC HEART DISEASE OF NATIVE CORONARY ARTERY W/OUT ANGINA PECTORIS: ICD-10-CM

## 2024-12-17 DIAGNOSIS — I34.1 NONRHEUMATIC MITRAL (VALVE) PROLAPSE: ICD-10-CM

## 2024-12-17 DIAGNOSIS — I07.1 RHEUMATIC TRICUSPID INSUFFICIENCY: ICD-10-CM

## 2024-12-17 DIAGNOSIS — R73.03 PREDIABETES.: ICD-10-CM

## 2024-12-17 DIAGNOSIS — E78.01 FAMILIAL HYPERCHOLESTEROLEMIA: ICD-10-CM

## 2024-12-17 DIAGNOSIS — R09.89 OTHER SPECIFIED SYMPTOMS AND SIGNS INVOLVING THE CIRCULATORY AND RESPIRATORY SYSTEMS: ICD-10-CM

## 2024-12-17 PROCEDURE — 99214 OFFICE O/P EST MOD 30 MIN: CPT

## 2024-12-17 PROCEDURE — 93306 TTE W/DOPPLER COMPLETE: CPT

## 2024-12-17 PROCEDURE — G2211 COMPLEX E/M VISIT ADD ON: CPT

## 2024-12-17 PROCEDURE — 93000 ELECTROCARDIOGRAM COMPLETE: CPT

## 2025-01-21 ENCOUNTER — APPOINTMENT (OUTPATIENT)
Dept: PULMONOLOGY | Facility: CLINIC | Age: 75
End: 2025-01-21
Payer: MEDICARE

## 2025-01-21 VITALS
WEIGHT: 145 LBS | SYSTOLIC BLOOD PRESSURE: 124 MMHG | OXYGEN SATURATION: 96 % | BODY MASS INDEX: 26.68 KG/M2 | HEART RATE: 85 BPM | HEIGHT: 62 IN | DIASTOLIC BLOOD PRESSURE: 78 MMHG | TEMPERATURE: 96.9 F | RESPIRATION RATE: 16 BRPM

## 2025-01-21 DIAGNOSIS — J45.909 UNSPECIFIED ASTHMA, UNCOMPLICATED: ICD-10-CM

## 2025-01-21 DIAGNOSIS — J30.9 ALLERGIC RHINITIS, UNSPECIFIED: ICD-10-CM

## 2025-01-21 DIAGNOSIS — R06.02 SHORTNESS OF BREATH: ICD-10-CM

## 2025-01-21 DIAGNOSIS — F41.9 ANXIETY DISORDER, UNSPECIFIED: ICD-10-CM

## 2025-01-21 DIAGNOSIS — J45.40 MODERATE PERSISTENT ASTHMA, UNCOMPLICATED: ICD-10-CM

## 2025-01-21 DIAGNOSIS — R76.8 OTHER SPECIFIED ABNORMAL IMMUNOLOGICAL FINDINGS IN SERUM: ICD-10-CM

## 2025-01-21 PROCEDURE — 95012 NITRIC OXIDE EXP GAS DETER: CPT

## 2025-01-21 PROCEDURE — 94010 BREATHING CAPACITY TEST: CPT

## 2025-01-21 PROCEDURE — 99214 OFFICE O/P EST MOD 30 MIN: CPT | Mod: 25

## 2025-01-27 ENCOUNTER — RX RENEWAL (OUTPATIENT)
Age: 75
End: 2025-01-27

## 2025-02-27 ENCOUNTER — APPOINTMENT (OUTPATIENT)
Dept: GERIATRICS | Facility: CLINIC | Age: 75
End: 2025-02-27
Payer: MEDICARE

## 2025-02-27 ENCOUNTER — NON-APPOINTMENT (OUTPATIENT)
Age: 75
End: 2025-02-27

## 2025-02-27 VITALS
TEMPERATURE: 98 F | SYSTOLIC BLOOD PRESSURE: 120 MMHG | BODY MASS INDEX: 25.83 KG/M2 | OXYGEN SATURATION: 99 % | HEART RATE: 86 BPM | DIASTOLIC BLOOD PRESSURE: 80 MMHG | WEIGHT: 140.38 LBS | RESPIRATION RATE: 16 BRPM | HEIGHT: 62 IN

## 2025-02-27 DIAGNOSIS — G47.9 SLEEP DISORDER, UNSPECIFIED: ICD-10-CM

## 2025-02-27 DIAGNOSIS — Z12.39 ENCOUNTER FOR OTHER SCREENING FOR MALIGNANT NEOPLASM OF BREAST: ICD-10-CM

## 2025-02-27 DIAGNOSIS — E78.5 HYPERLIPIDEMIA, UNSPECIFIED: ICD-10-CM

## 2025-02-27 PROCEDURE — 99214 OFFICE O/P EST MOD 30 MIN: CPT

## 2025-06-02 ENCOUNTER — TRANSCRIPTION ENCOUNTER (OUTPATIENT)
Age: 75
End: 2025-06-02

## 2025-06-23 ENCOUNTER — TRANSCRIPTION ENCOUNTER (OUTPATIENT)
Age: 75
End: 2025-06-23

## 2025-07-28 ENCOUNTER — APPOINTMENT (OUTPATIENT)
Dept: PULMONOLOGY | Facility: CLINIC | Age: 75
End: 2025-07-28
Payer: MEDICARE

## 2025-07-28 VITALS
TEMPERATURE: 97.4 F | WEIGHT: 143 LBS | HEART RATE: 80 BPM | OXYGEN SATURATION: 96 % | DIASTOLIC BLOOD PRESSURE: 71 MMHG | RESPIRATION RATE: 16 BRPM | HEIGHT: 62 IN | BODY MASS INDEX: 26.31 KG/M2 | SYSTOLIC BLOOD PRESSURE: 111 MMHG

## 2025-07-28 DIAGNOSIS — J45.909 UNSPECIFIED ASTHMA, UNCOMPLICATED: ICD-10-CM

## 2025-07-28 DIAGNOSIS — R06.02 SHORTNESS OF BREATH: ICD-10-CM

## 2025-07-28 DIAGNOSIS — J45.40 MODERATE PERSISTENT ASTHMA, UNCOMPLICATED: ICD-10-CM

## 2025-07-28 DIAGNOSIS — R76.8 OTHER SPECIFIED ABNORMAL IMMUNOLOGICAL FINDINGS IN SERUM: ICD-10-CM

## 2025-07-28 DIAGNOSIS — J82.83 EOSINOPHILIC ASTHMA: ICD-10-CM

## 2025-07-28 DIAGNOSIS — R06.83 SNORING: ICD-10-CM

## 2025-07-28 DIAGNOSIS — J30.9 ALLERGIC RHINITIS, UNSPECIFIED: ICD-10-CM

## 2025-07-28 PROCEDURE — 94729 DIFFUSING CAPACITY: CPT

## 2025-07-28 PROCEDURE — 99214 OFFICE O/P EST MOD 30 MIN: CPT | Mod: 25

## 2025-07-28 PROCEDURE — 94010 BREATHING CAPACITY TEST: CPT

## 2025-07-28 PROCEDURE — 95012 NITRIC OXIDE EXP GAS DETER: CPT

## 2025-07-28 PROCEDURE — 94727 GAS DIL/WSHOT DETER LNG VOL: CPT

## 2025-07-28 PROCEDURE — ZZZZZ: CPT

## 2025-08-04 ENCOUNTER — RX RENEWAL (OUTPATIENT)
Age: 75
End: 2025-08-04

## 2025-09-09 ENCOUNTER — TRANSCRIPTION ENCOUNTER (OUTPATIENT)
Age: 75
End: 2025-09-09